# Patient Record
Sex: MALE
[De-identification: names, ages, dates, MRNs, and addresses within clinical notes are randomized per-mention and may not be internally consistent; named-entity substitution may affect disease eponyms.]

---

## 2018-11-14 ENCOUNTER — HOSPITAL ENCOUNTER (EMERGENCY)
Dept: HOSPITAL 97 - ER | Age: 43
Discharge: HOME | End: 2018-11-14
Payer: SELF-PAY

## 2018-11-14 DIAGNOSIS — E11.65: Primary | ICD-10-CM

## 2018-11-14 LAB
ALBUMIN SERPL BCP-MCNC: 4 G/DL (ref 3.4–5)
ALP SERPL-CCNC: 122 U/L (ref 45–117)
ALT SERPL W P-5'-P-CCNC: 55 U/L (ref 12–78)
AST SERPL W P-5'-P-CCNC: 22 U/L (ref 15–37)
BLD SMEAR INTERP: (no result)
BUN BLD-MCNC: 14 MG/DL (ref 7–18)
GIANT PLATELETS BLD QL SMEAR: (no result)
GLUCOSE SERPLBLD-MCNC: 280 MG/DL (ref 74–106)
HCT VFR BLD CALC: 47.9 % (ref 39.6–49)
INR BLD: 0.97
LYMPHOCYTES # SPEC AUTO: 2.4 K/UL (ref 0.7–4.9)
MAGNESIUM SERPL-MCNC: 1.9 MG/DL (ref 1.8–2.4)
MCH RBC QN AUTO: 28.9 PG (ref 27–35)
MCV RBC: 82.6 FL (ref 80–100)
MORPHOLOGY BLD-IMP: (no result)
NT-PROBNP SERPL-MCNC: 20 PG/ML (ref ?–125)
PMV BLD: 8.6 FL (ref 7.6–11.3)
POTASSIUM SERPL-SCNC: 3.9 MMOL/L (ref 3.5–5.1)
RBC # BLD: 5.8 M/UL (ref 4.33–5.43)
TROPONIN (EMERG DEPT USE ONLY): < 0.02 NG/ML (ref 0–0.04)

## 2018-11-14 PROCEDURE — 71045 X-RAY EXAM CHEST 1 VIEW: CPT

## 2018-11-14 PROCEDURE — 96360 HYDRATION IV INFUSION INIT: CPT

## 2018-11-14 PROCEDURE — 36415 COLL VENOUS BLD VENIPUNCTURE: CPT

## 2018-11-14 PROCEDURE — 85025 COMPLETE CBC W/AUTO DIFF WBC: CPT

## 2018-11-14 PROCEDURE — 81003 URINALYSIS AUTO W/O SCOPE: CPT

## 2018-11-14 PROCEDURE — 80048 BASIC METABOLIC PNL TOTAL CA: CPT

## 2018-11-14 PROCEDURE — 83735 ASSAY OF MAGNESIUM: CPT

## 2018-11-14 PROCEDURE — 93005 ELECTROCARDIOGRAM TRACING: CPT

## 2018-11-14 PROCEDURE — 99284 EMERGENCY DEPT VISIT MOD MDM: CPT

## 2018-11-14 PROCEDURE — 83880 ASSAY OF NATRIURETIC PEPTIDE: CPT

## 2018-11-14 PROCEDURE — 80076 HEPATIC FUNCTION PANEL: CPT

## 2018-11-14 PROCEDURE — 82962 GLUCOSE BLOOD TEST: CPT

## 2018-11-14 PROCEDURE — 85610 PROTHROMBIN TIME: CPT

## 2018-11-14 PROCEDURE — 84484 ASSAY OF TROPONIN QUANT: CPT

## 2018-11-14 NOTE — EDPHYS
Physician Documentation                                                                           

 [facility]                                                                

Name: Beto Perez                                                                               

Age: 42 yrs                                                                                       

Sex: Male                                                                                         

: 1975                                                                                   

MRN: [id number]                                                                                   

Arrival Date: 2018                                                                          

Time: 11:13                                                                                       

Account#: [id number]                                                                            

Bed 24                                                                                            

Private MD: None, None                                                                            

ED Physician Blayne Gallegos                                                                       

HPI:                                                                                              

                                                                                             

16:49 This 42 yrs old  Male presents to ER via Ambulatory with complaints of High     kb  

      Blood Sugar.                                                                                

16:49 The patient or guardian reports hyperglycemia, that was potentially precipitated by no  kb  

      particular event.                                                                           

16:49 Onset: The symptoms/episode began/occurred 1 week(s) ago. Associated signs and          kb  

      symptoms: Pertinent positives: chest pain. Current symptoms: In the emergency               

      department the patient's symptoms are unchanged from the initial presentation. The          

      patient has experienced similar episodes in the past, several times. The patient has        

      not recently seen a physician. Pt reports high blood sugar and chest pain for a week.       

      States it was 320 pta.                                                                      

                                                                                                  

Historical:                                                                                       

- Allergies:                                                                                      

11:28 No Known Allergies;                                                                     aj  

- Home Meds:                                                                                      

11:28 Glipizide Oral [Active]; Metformin Oral [Active];                                       aj  

- PMHx:                                                                                           

11:28 Diabetes - NIDDM;                                                                       aj  

- PSHx:                                                                                           

11:28 colostomy with reversal; Hernia repair;                                                 aj  

                                                                                                  

- Immunization history:: Adult Immunizations up to date.                                          

- Social history:: Smoking status: Patient/guardian denies using tobacco.                         

- Ebola Screening: : Patient negative for fever greater than or equal to 101.5 degrees            

  Fahrenheit, and additional compatible Ebola Virus Disease symptoms Patient denies               

  exposure to infectious person Patient denies travel to an Ebola-affected area in the            

  21 days before illness onset No symptoms or risks identified at this time.                      

                                                                                                  

                                                                                                  

ROS:                                                                                              

16:49 Constitutional: Negative for fever, chills, and weight loss, ENT: Negative for injury,  kb  

      pain, and discharge, Respiratory: Negative for shortness of breath, cough, wheezing,        

      and pleuritic chest pain, Abdomen/GI: Negative for abdominal pain, nausea, vomiting,        

      diarrhea, and constipation, Back: Negative for injury and pain, MS/Extremity: Negative      

      for injury and deformity, Skin: Negative for injury, rash, and discoloration, Neuro:        

      Negative for headache, weakness, numbness, tingling, and seizure.                           

16:49 Cardiovascular: Positive for chest pain, Negative for edema, orthopnea, palpitations,       

      paroxysmal nocturnal dyspnea.                                                               

                                                                                                  

Exam:                                                                                             

16:49 Constitutional:  This is a well developed, well nourished patient who is awake, alert,  kb  

      and in no acute distress. Head/Face:  Normocephalic, atraumatic. ENT:  Nares patent. No     

      nasal discharge, no septal abnormalities noted.  Tympanic membranes are normal and          

      external auditory canals are clear.  Oropharynx with no redness, swelling, or masses,       

      exudates, or evidence of obstruction, uvula midline.  Mucous membranes moist. Neck:         

      Trachea midline, no thyromegaly or masses palpated, and no cervical lymphadenopathy.        

      Supple, full range of motion without nuchal rigidity, or vertebral point tenderness.        

      No Meningismus. Chest/axilla:  Normal chest wall appearance and motion.  Nontender with     

      no deformity.  No lesions are appreciated. Cardiovascular:  Regular rate and rhythm         

      with a normal S1 and S2.  No gallops, murmurs, or rubs.  Normal PMI, no JVD.  No pulse      

      deficits. Respiratory:  Lungs have equal breath sounds bilaterally, clear to                

      auscultation and percussion.  No rales, rhonchi or wheezes noted.  No increased work of     

      breathing, no retractions or nasal flaring. Abdomen/GI:  Soft, non-tender, with normal      

      bowel sounds.  No distension or tympany.  No guarding or rebound.  No evidence of           

      tenderness throughout. Skin:  Warm, dry with normal turgor.  Normal color with no           

      rashes, no lesions, and no evidence of cellulitis. MS/ Extremity:  Pulses equal, no         

      cyanosis.  Neurovascular intact.  Full, normal range of motion. Neuro:  Awake and           

      alert, GCS 15, oriented to person, place, time, and situation.  Cranial nerves II-XII       

      grossly intact.  Motor strength 5/5 in all extremities.  Sensory grossly intact.            

      Cerebellar exam normal.  Normal gait.                                                       

                                                                                                  

Vital Signs:                                                                                      

11:28  / 89; Pulse 71; Resp 16; Temp 98.9; Pulse Ox 98% on R/A; Weight 99.34 kg; Height aj  

      5 ft. 6 in. (167.64 cm);                                                                    

13:19  / 91; Pulse 81; Resp 18; Pulse Ox 97% ;                                          tl3 

14:51  / 80; Pulse 64; Resp 18; Pulse Ox 99% on R/A;                                    tl3 

11:28 Body Mass Index 35.35 (99.34 kg, 167.64 cm)                                             aj  

                                                                                                  

MDM:                                                                                              

13:19 Patient medically screened.                                                             kb  

16:49 Data reviewed: vital signs, nurses notes. Data interpreted: Pulse oximetry: on room air kb  

      is 99 %. Interpretation: normal. Counseling: I had a detailed discussion with the           

      patient and/or guardian regarding: the historical points, exam findings, and any            

      diagnostic results supporting the discharge/admit diagnosis, lab results, radiology         

      results, the need for outpatient follow up, a family practitioner, to return to the         

      emergency department if symptoms worsen or persist or if there are any questions or         

      concerns that arise at home.                                                                

                                                                                                  

                                                                                             

13:24 Order name: Basic Metabolic Panel; Complete Time: 14:09                                 kb  

                                                                                             

13:24 Order name: CBC with Diff; Complete Time: 15:57                                         kb  

                                                                                             

13:24 Order name: LFT's; Complete Time: 14:09                                                 kb  

                                                                                             

13:24 Order name: Magnesium; Complete Time: 14:09                                             kb  

                                                                                             

13:24 Order name: NT PRO-BNP; Complete Time: 14:09                                            kb  

                                                                                             

13:24 Order name: PT-INR; Complete Time: 14:06                                                kb  

                                                                                             

13:24 Order name: Troponin (emerg Dept Use Only); Complete Time: 14:09                        kb  

                                                                                             

13:24 Order name: XRAY Chest (1 view); Complete Time: 14:52                                   kb  

                                                                                             

13:24 Order name: EKG; Complete Time: 13:25                                                   kb  

                                                                                             

13:52 Order name: Urine Dipstick--Ancillary (enter results); Complete Time: 16:45             bd  

                                                                                             

14:45 Order name: CBC Smear Scan; Complete Time: 15:57                                        EDMS

                                                                                             

15:25 Order name: EKG; Complete Time: 15:26                                                   kb  

                                                                                             

15:25 Order name: Troponin (emerg Dept Use Only); Complete Time: 16:45                        kb  

                                                                                             

13:24 Order name: Cardiac monitoring; Complete Time: 15:54                                    kb  

                                                                                             

13:24 Order name: EKG - Nurse/Tech; Complete Time: 15:54                                      kb  

                                                                                             

13:24 Order name: IV Saline Lock; Complete Time: 13:56                                        kb  

                                                                                             

13:24 Order name: Labs collected and sent; Complete Time: 13:56                               kb  

                                                                                             

13:24 Order name: O2 Per Protocol; Complete Time: 15:23                                       kb  

                                                                                             

13:24 Order name: O2 Sat Monitoring; Complete Time: 15:23                                     kb  

                                                                                             

15:25 Order name: EKG - Nurse/Tech; Complete Time: 15:54                                      kb  

                                                                                                  

Administered Medications:                                                                         

15:23 Drug: NS 0.9% 1000 ml Route: IV; Rate: 1000 ml; Site: right antecubital; Delivery:      tl3 

      Primary tubing;                                                                             

16:30 Follow up: IV Status: Completed infusion; IV Intake: 1000ml                             tl3 

                                                                                                  

                                                                                                  

Point of Care Testing:                                                                            

      Blood Glucose:                                                                              

11: Blood Glucose: 314 mg/dL;                                                               saima  

      Ranges:                                                                                     

      Critical Glucose Levels:Adult <50 mg/dl or >400 mg/dl  <40 mg/dl or >180 mg/dl       

Disposition:                                                                                      

11/15                                                                                             

13:53 Co-signature as Attending Physician, Blayne Gallegos MD I agree with the assessment and   kdr 

      plan of care.                                                                               

                                                                                                  

Disposition:                                                                                      

18 16:48 Discharged to Home. Impression: Hyperglycemia, unspecified, Chest pain,            

  unspecified.                                                                                    

- Condition is Stable.                                                                            

- Discharge Instructions: Nonspecific Chest Pain, Easy-to-Read, Hyperglycemia,                    

  Easy-to-Read.                                                                                   

                                                                                                  

- Work release form, Medication Reconciliation Form, Thank You Letter, Antibiotic                 

  Education, Prescription Opioid Use form.                                                        

- Follow up: Private Physician; Reason: Recheck today's complaints, Continuance of                

  care, Re-evaluation by your physician. Follow up: Emergency Department; When: As                

  needed; Reason: Worsening of condition.                                                         

                                                                                                  

                                                                                                  

                                                                                                  

Signatures:                                                                                       

Dispatcher [employer]                           ISAKMS                                                 

Lorie Rees, FNP-C                 FNP-Ckb                                                   

Selene Light RN RN aj Rittger, Kevin, MD MD kdr Lowrey, Tammy, RN                       RN   tl3                                                  

                                                                                                  

Corrections: (The following items were deleted from the chart)                                    

                                                                                             

17:31 16:48 2018 16:48 Discharged to Home. Impression: Hyperglycemia, unspecified;      tl3 

      Chest pain, unspecified. Condition is Stable. Forms are Medication Reconciliation Form,     

      Thank You Letter, Antibiotic Education, Prescription Opioid Use. Follow up: Private         

      Physician; Reason: Recheck today's complaints, Continuance of care, Re-evaluation by        

      your physician. Follow up: Emergency Department; When: As needed; Reason: Worsening of      

      condition. kb                                                                               

                                                                                                  

**************************************************************************************************

## 2018-11-14 NOTE — XMS REPORT
Summary of Care: 17 - 17

 Created on:2112



Patient:DASHA HINOJOSA

Sex:Male

:1975

External Reference #:[id number]





Demographics







 Address  [address] [address]

 

 Home Phone  [phone]

 

 Email Address  NONE

 

 Preferred Language  English

 

 Marital Status  Single

 

 Mu-ism Affiliation  Sikh

 

 Race  Other

 

 Ethnic Group   or 









Author







 Organization  [facility]

 

 Address  [address] [place] [address]-

 

 Phone  [phone]









Encounter

HQ Axel(FIN) [id number] Date(s): 17 - 17

[facility] [address] [place] [address]-     
 

Discharge Diagnosis: Acute Hyperglycemia

Discharge Disposition: Home or Self Care

Attending Physician: Chidi Garcia MD





Vital Signs







 Most recent to oldest  1  2  3



 [Reference Range]:      

 

 Height  167.64 cm    



   (17 11:56 PM)    

 

 Temperature Oral [96.4-99.1  98.2 DegF  98.3 DegF  



 DegF]  (17 4:35 AM)  (17 11:56 PM)  

 

 Blood Pressure [/60-90  124/68 mmHg  138/90 mmHg  136/92 mmHg



 mmHg]  (17 4:35 AM)  (17 3:15 AM)  (17 11:56 PM)

 

 Respiratory Rate [14-20 BRMIN]  16 BRMIN  18 BRMIN  



   (17 3:15 AM)  (17 11:56 PM)  

 

 Peripheral Pulse Rate [  65 bpm  78 bpm  



 bpm]  (17 3:15 AM)  (17 11:56 PM)  

 

 Weight  109.091 kg    



   (17 11:56 PM)    

 

 Body Mass Index  38.82 m2    



   (17 11:56 PM)    







Problem List







 Condition  Effective Dates  Status  Health Status  Informant

 

 Diverticulitis(Confirmed)    Resolved    

 

 DM (diabetes mellitus)(Confirmed)    Resolved    

 

 Obesity(Confirmed)    Resolved    







Allergies, Adverse Reactions, Alerts







 Substance  Reaction  Severity  Status

 

 morphine      Active







Medications

glipiZIDE-metformin 2.5 mg-500 mg oral tablet

1 tab, PO, BID, # 60 tab, 0 Refill(s)

Start Date: 17

Status: OrderedInsulin regular

8 unit, 0.08 mL, Route: IVP, Drug form: INJ, ONCE, Dosing Weight 109.091, kg, 
Priority: STAT, Start date: 17 3:29:00 CST, Stop date: 17 3:29:00 
CST

Notes: (Same as: Humulin R and NovoLIN R)WASTE: F/P - Black; E - Municipal 
Trash Bin  (Do not shake)

Start Date: 17

Stop Date: 17

Status: OrderedInsulin regular

10 unit, 0.1 mL, Route: IVP, Drug form: INJ, ONCE, Dosing Weight 109.091, kg, 
Priority: STAT, Start date: 17 2:39:00 CST, Stop date: 17 2:39:00 
CST

Notes: (Same as: Humulin R and NovoLIN R)WASTE: F/P - Black; E - Municipal 
Trash Bin  (Do not shake)

Start Date: 17

Stop Date: 17

Status: CompletedKeflex 500 mg oral capsule

500 mg=1 cap, PO, QID, X 7 day, # 28 cap, 0 Refill(s)

Start Date: 17

Stop Date: 17

Status: Orderednystatin topical 100,000 units/g ointment

1 appl, TOP, TID, X 7 day, # 15 gm, 0 Refill(s)

Start Date: 17

Stop Date: 17

Status: OrderedSodium Chloride 0.9% (Bolus) IV

1,000 mL, 1000 ml/hr, Infuse Over: 1 hr, Route: IV, 1,000, Drug form: INJ, ONCE
, Priority: STAT, Dosing Weight 109.091 kg, Start date: 17 2:39:00 CST, 
Duration: 1 doses or times, Stop date: 17 2:39:00 CST

Start Date: 17

Stop Date: 17

Status: Completed



Results

ELECTROLYTES





 Most recent to oldest [Reference Range]:  1

 

 Sodium Lvl [135-145 mEq/L]  138 mEq/L



   (17 2:55 AM)

 

 Potassium Lvl [3.5-5.1 mEq/L]  3.8 mEq/L



   (17 2:55 AM)

 

 Chloride Lvl [ mEq/L]  100 mEq/L



   (17 2:55 AM)

 

 CO2 [24-32 mEq/L]  27 mEq/L



   (17 2:55 AM)

 

 AGAP [10.0-20.0 mEq/L]  14.8 mEq/L



   (17 2:55 AM)



CHEM PANEL





 Most recent to oldest [Reference Range]:  1

 

 Creatinine Lvl [0.50-1.40 mg/dL]  0.84 mg/dL



   (17 2:55 AM)

 

 eGFR  109 mL/min/1.73m2 1



   *NA*



   (17 2:55 AM)

 

 BUN [7-22 mg/dL]  12 mg/dL



   (17 2:55 AM)

 

 B/C Ratio [6-25]  14



   (17 2:55 AM)

 

 Glucose Lvl [70-99 mg/dL]  393 mg/dL



   *HI*



   (17 2:55 AM)

 

 Total Protein [6.4-8.4 g/dL]  7.4 g/dL



   (17 2:55 AM)

 

 Albumin Lvl [3.5-5.0 g/dL]  3.8 g/dL



   (17 2:55 AM)

 

 Globulin [2.7-4.2 g/dL]  3.6 g/dL



   (17 2:55 AM)

 

 A/G Ratio [0.7-1.6]  1.1



   (17 2:55 AM)

 

 Calcium Lvl [8.5-10.5 mg/dL]  8.9 mg/dL



   (17 2:55 AM)

 

 ALT [0-65 unit/L]  45 unit/L



   (17 2:55 AM)

 

 AST [0-37 unit/L]  16 unit/L



   (17 2:55 AM)

 

 Alk Phos [ unit/L]  147 unit/L



   *HI*



   (17 2:55 AM)

 

 Bili Total [0.2-1.3 mg/dL]  0.7 mg/dL



   (17 2:55 AM)



1Result Comment: The eGFR is calculated using the CKD-EPI formula. In most young
, healthy individualsthe eGFR will be >90 mL/min/1.73m2. The eGFR declines with 
age. An eGFR of 60-89 may be normal in some populations, particularly the 
elderly, for whom the CKD-EPI formula has not been extensively validated. Use 
of the eGFR is not recommended in the following populations:



Individuals with unstable creatinine concentrations, including pregnant 
patients and those with serious co-morbid conditions.



Patients with extremes in muscle mass or diet.



The data above are obtained from the National Kidney Disease Education Program (
NKDEP) which additionally recommends that when the eGFR is used in patients 
with extremes of body mass index for purposesof drug dosing, the eGFR should be 
multiplied by the estimated BMI.URINE AND STOOL





 Most recent to oldest [Reference Range]:  1

 

 UA Turbidity [Clear]  Clear



   (17 2:55 AM)

 

 UA Color [Yellow]  Yellow



   *NA*



   (17 2:55 AM)

 

 UA pH [5.0-8.0]  5.0



   (17 2:55 AM)

 

 UA Spec Grav [<=1.030]  <=1.005



   *NA*



   (17 2:55 AM)

 

 UA Glucose [Negative mg/dL]  500 mg/dL



   *ABN*



   (17 2:55 AM)

 

 UA Blood [Negative]  Negative



   (17 2:55 AM)

 

 UA Ketones [Negative]  Negative



   *NA*



   (17 2:55 AM)

 

 UA Protein [Negative]  Negative



   (17 2:55 AM)

 

 UA Urobilinogen [0.1-1.0 EU/dL]  0.2 EU/dL



   (17 2:55 AM)

 

 UA Bili [Negative]  Negative



   *NA*



   (17 2:55 AM)

 

 UA Leuk Est [Negative]  Negative



   (17 2:55 AM)

 

 UA Nitrite [Negative]  Negative



   (17 2:55 AM)

 

 UA WBC [None Seen /HPF]  0-2 /HPF



   (17 2:55 AM)

 

 UA RBC [0-2]  None Seen



   (17 2:55 AM)

 

 UA Bacteria [None Seen]  None Seen



   (17 2:55 AM)

 

 UA Sq Epi [Few]  None Seen



   (17 2:55 AM)

 

 UA Mucus [None Seen]  None Seen



   (17 2:55 AM)

 

 UA Bud Yeast [None Seen /HPF]  Few /HPF



   *ABN*



   (17 2:55 AM)



HEMATOLOGY





 Most recent to oldest [Reference Range]:  1

 

 WBC [3.7-10.4 K/CMM]  8.6 K/CMM



   (17 2:55 AM)

 

 RBC [4.70-6.10 M/CMM]  5.47 M/CMM



   (17 2:55 AM)

 

 Hgb [14.0-18.0 g/dL]  15.5 g/dL



   (17 2:55 AM)

 

 Hct [42.0-54.0 %]  43.9 %



   (17 2:55 AM)

 

 MCV [80.0-94.0 fL]  80.3 fL



   (17 2:55 AM)

 

 MCH [27.0-31.0 pg]  28.3 pg



   (17 2:55 AM)

 

 MCHC [32.0-36.0 g/dL]  35.2 g/dL



   (17 2:55 AM)

 

 RDW [11.5-14.5 %]  13.7 %



   (17 2:55 AM)

 

 Platelet [133-450 K/CMM]  243 K/CMM



   (17 2:55 AM)

 

 MPV [7.4-10.4 fL]  8.3 fL



   (17 2:55 AM)

 

 Segs [45.0-75.0 %]  60.7 %



   (17 2:55 AM)

 

 Lymphocytes [20.0-40.0 %]  29.4 %



   (17 2:55 AM)

 

 Monocytes [2.0-12.0 %]  7.1 %



   (17 2:55 AM)

 

 Eosinophils [0.0-4.0 %]  1.8 %



   (17 2:55 AM)

 

 Basophils [0.0-1.0 %]  1.0 %



   (17 2:55 AM)

 

 Segs-Bands # [1.5-8.1 K/CMM]  5.2 K/CMM



   (17 2:55 AM)

 

 Lymphocytes # [1.0-5.5 K/CMM]  2.5 K/CMM



   (17 2:55 AM)

 

 Monocytes # [0.0-0.8 K/CMM]  0.6 K/CMM



   (17 2:55 AM)

 

 Eosinophils # [0.0-0.5 K/CMM]  0.2 K/CMM



   (17 2:55 AM)

 

 Basophils # [0.0-0.2 K/CMM]  0.1 K/CMM



   (17 2:55 AM)







Immunizations

No data available for this section



Procedures







 Procedure  Date  Related Diagnosis  Body Site

 

 Closure of colostomy      







Social History







 Social History Type  Response

 

 Smoking Status  Never smoker; Exposure to Tobacco Smoke None; Cigarette Smoking



   Last 365 Days No; Reg Smoking Cessation Counseling No







Assessment and Plan

No data available for this section

## 2018-11-14 NOTE — XMS REPORT
Encounter??CCD:??11/15/2011??to??2011

 Created on:2104



Patient:DASHA HINOJOSA

Sex:Male

:1975

External Reference #:[id number]





Demographics







 Address  [address] [address]



   [place] [address]

 

 Home Phone  [phone]

 

 Preferred Language  Unknown

 

 Marital Status  Unknown

 

 Caodaism Affiliation  Unknown

 

 Race  Unknown

 

 Ethnic Group  Unknown









Author







 Organization  [facility]









Care Team Providers







 Name  Role  Phone

 

 ROPUser, [facility]  Consulting Provider  Unavailable

 

 Anthony Parker  Consulting Provider  [phone]

 

 Sofia Jones  Consulting Provider  [phone]

 

 Jackelin Stein  Consulting Provider  Unavailable

 

 Wesley Mendoza  Consulting Provider  Unavailable

 

 PCP, None  Consulting Provider  Unavailable

 

 Eleazar Rothman  Consulting Provider  [phone]

 

 ChartServer, Login  Consulting Provider  Unavailable

 

 Ernesto Brumfield  Consulting Provider  [phone]

 

 Ami Longo  Consulting Provider  Unavailable

 

 Nishi Cuello  Consulting Provider  Unavailable

 

 Stephen Macedo  Consulting Provider  [phone]

 

 SYSTEM, SYSTEM  Consulting Provider  Unavailable

 

 Unavailable  Primary Care Provider  Unavailable

 

 Saturnino Gaona  Consulting Provider  [phone]

 

 Bibiana Mendez  Consulting Provider  Unavailable

 

 Elmer Carrillo  Consulting Provider  Unavailable









Allergies, Adverse Reactions, Alerts







 Substance  Reaction  Status

 

 morphine  ??  Active







Medications







 Medication  Instructions  Start Date  End Date  Status

 

 Insulin regular  6 unit, Route: IV, ONCE,  2011  Completed



   Start date: 11      



   4:00:00, Stop date:      



   11 4:00:00      

 

 Insulin regular  10 unit, Route: IVP, ONCE,  2011  Completed



   Priority: STAT, Start      



   date: 11 0:49:00,      



   Stop date: 11      



   0:49:00      

 

 Insulin regular  10 unit, Route: SUB-Q,  11/15/2011  11/15/2011  Completed



   ONCE, Priority: STAT,      



   Start date: 11/15/11      



   23:25:00, Stop date:      



   11/15/11 23:25:00      

 

 Insulin regular  10 unit, Route: IVP, ONCE,  11/15/2011  11/15/2011  Completed



   Priority: STAT, Start      



   date: 11/15/11 23:25:00,      



   Stop date: 11/15/11      



   23:25:00      

 

 Sodium Chloride 0.9%  1,000 mL, Rate: 1,000  11/15/2011  2011  Completed



 (Bolus) IV 1000 mL  ml/hr, Infuse over: 1 hr,      



   Route: IV, Total Volume:      



   1,000, Bolus Dose,      



   Priority: STAT, Start      



   date: 11/15/11 23:24:00,      



   Duration: 1 doses or      



   times, Stop date: 11      



   0:23:00      

 

 Sodium Chloride 0.9%  1,000 mL, Rate: 1,000  11/15/2011  11/15/2011  Completed



 (Bolus) IV 1000 mL  ml/hr, Infuse over: 1 hr,      



   Route: IV, Total Volume:      



   1,000, Bolus Dose,      



   Priority: STAT, Start      



   date: 11/15/11 23:24:00,      



   Duration: 1 doses or      



   times, Stop date: 11      



   0:23:00      

 

 metFORmin 500 mg oral  500 mg, 1 tab, PO, BID, 60  2011  ??  Ordered



 tablet  tab, Substitution Allowed      

 

 Sodium Chloride 0.9%  1,000 mL, Rate: 1,000  2011  Completed



 (Bolus) IV 1000 mL  ml/hr, Infuse over: 1 hr,      



   Route: IV, Total Volume:      



   1,000, Bolus Dose,      



   Priority: STAT, Start      



   date: 11 2:51:00,      



   Duration: 1 doses or      



   times, Stop date: 11      



   3:50:00      

 

 metFORmin 500 mg oral  500 mg, 1 tab, Route: PO,  2011  
Discontinued



 tablet, extended release  Drug form: ERTAB, Daily,      



   Start date: 11      



   4:16:00, Duration: 30 day,      



   Stop date: 12/15/11      



   9:00:00      







Vital Signs







 Most recent to oldest  1  2  3



 [Reference Range]:      

 

 Height  165.10 cm  ??  ??



   (11/15/2011 22:58:00) ??    

 

 Temperature Oral  98.5 DegF  98.3 DegF  ??



 [96.4-99.1 DegF]  (2011 04:42:00) ??  (11/15/2011 22:58:00) ??  

 

 Systolic Blood Pressure  156 mmHg  168 mmHg  178 mmHg



 [ mmHg]  *HI*  *HI*  *HI*



   (2011 04:42:00) ??  (2011 03:30:00) ??  (2011 01:33:00) ??

 

 Diastolic Blood Pressure  84 mmHg  93 mmHg  81 mmHg



 [60-90 mmHg]  (2011 04:42:00) ??  *HI*  (2011 01:33:00) ??



     (2011 03:30:00) ??  

 

 Respiratory Rate [14-20  18 BRMIN  18 BRMIN  18 BRMIN



 BRMIN]  (2011 04:42:00) ??  (2011 03:30:00) ??  (2011 01:33:
00) ??

 

 Peripheral Pulse Rate  80 bpm  78 bpm  89 bpm



 [ bpm]  (2011 04:42:00) ??  (2011 03:30:00) ??  (2011 
01:33:00) ??

 

 Weight  113.636 kg  ??  ??



   (11/15/2011 22:58:00) ??    







Results

BEDSIDE GLUCOSE TESTING





 Most recent to oldest  1  2  3



 [Reference Range]:      

 

 Gluc POC Lifscn [  302 mg/dL 1  320 mg/dL 2  >400 mg/dL 3



 mg/dL]  *HI*  *HI*  *CRIT*



   (2011 03:58:00) ??  (2011 02:46:00) ??  (2011 01:24:00) ??

 

 Comment1  Notify RN/MD  Notify RN/MD  ??



   *NA*  *NA*  



   (2011 00:38:00) ??  (11/15/2011 23:02:00) ??  



1Interpretive Data:  Upper Reportable Limit: 200 mg/dL.2Interpretive 
Data:  Upper Reportable Limit: 200 mg/dL.3Interpretive Data:  
Upper Reportable Limit: 200 mg/dL.CHEMISTRY





 Most recent to oldest [Reference Range]:  1  2  3

 

 Sodium Lvl [135-145 mEq/L]  123 mEq/L  ??  ??



   *LOW*    



   (11/15/2011 23:20:00) ??    

 

 Potassium Lvl [3.5-5.1 mEq/L]  4.4 mEq/L  ??  ??



   (11/15/2011 23:20:00) ??    

 

 Chloride Lvl [ mEq/L]  90 mEq/L  ??  ??



   *LOW*    



   (11/15/2011 23:20:00) ??    

 

 CO2 [24-32 mEq/L]  22 mEq/L  ??  ??



   *LOW*    



   (11/15/2011 23:20:00) ??    

 

 AGAP [10.0-20.0 mEq/L]  15.4 mEq/L  ??  ??



   (11/15/2011 23:20:00) ??    

 

 Creatinine Lvl [0.5-1.4 mg/dL]  1.0 mg/dL  ??  ??



   (11/15/2011 23:20:00) ??    

 

 BUN [7-22 mg/dL]  12 mg/dL  ??  ??



   (11/15/2011 23:20:00) ??    

 

 Glucose Lvl  725 mg/dL 4,??5  ??  ??



   *CRIT*    



   (11/15/2011 23:20:00) ??    

 

 Calcium Lvl [8.5-10.5 mg/dL]  9.3 mg/dL  ??  ??



   (11/15/2011 23:20:00) ??    



4Result Comment: Critical Result(s) called to Katey at 2011 0:36 by_.tas
  Read back OK.5Interpretive Data: Reference Ranges :     0 - 7 days  : 41 - 90 
mg/dL7 days - 150 yrs : 70 - 99 mg/dL (fasting), based on the clinical 
recommendations of the American Diabetes Association.HEMATOLOGY





 Most recent to oldest [Reference Range]:  1  2  3

 

 WBC [3.7-10.4 K/CMM]  8.1 K/CMM  ??  ??



   (11/15/2011 23:20:00) ??    

 

 RBC [4.70-6.10 M/CMM]  5.64 M/CMM  ??  ??



   (11/15/2011 23:20:00) ??    

 

 Hgb [14.0-18.0 g/dL]  16.5 g/dL  ??  ??



   (11/15/2011 23:20:00) ??    

 

 Hct [42.0-54.0 %]  45.9 %  ??  ??



   (11/15/2011 23:20:00) ??    

 

 MCV [80.0-94.0 fL]  81.3 fL  ??  ??



   (11/15/2011 23:20:00) ??    

 

 MCH [27.0-31.0 pg]  29.3 pg  ??  ??



   (11/15/2011 23:20:00) ??    

 

 MCHC [32.0-36.0 g/dL]  36.1 g/dL  ??  ??



   *HI*    



   (11/15/2011 23:20:00) ??    

 

 RDW [11.5-14.5 %]  13.3 %  ??  ??



   (11/15/2011 23:20:00) ??    

 

 Platelet [133-450 K/CMM]  230 K/CMM  ??  ??



   (11/15/2011 23:20:00) ??    

 

 MPV [7.4-10.4 fL]  9.3 fL  ??  ??



   (11/15/2011 23:20:00) ??    

 

 Segs [45.0-75.0 %]  70.0 %  ??  ??



   (11/15/2011 23:20:00) ??    

 

 Lymphocytes [20.0-40.0 %]  23.4 %  ??  ??



   (11/15/2011 23:20:00) ??    

 

 Monocytes [2.0-12.0 %]  5.3 %  ??  ??



   (11/15/2011 23:20:00) ??    

 

 Eosinophils [0.0-4.0 %]  1.0 %  ??  ??



   (11/15/2011 23:20:00) ??    

 

 Basophils [0.0-1.0 %]  0.3 %  ??  ??



   (11/15/2011 23:20:00) ??    

 

 Segs-Bands # [1.5-8.1 K/CMM]  5.6 K/CMM  ??  ??



   (11/15/2011 23:20:00) ??    

 

 Lymphocytes # [1.0-5.5 K/CMM]  1.9 K/CMM  ??  ??



   (11/15/2011 23:20:00) ??    

 

 Monocytes # [0.0-0.8 K/CMM]  0.4 K/CMM  ??  ??



   (11/15/2011 23:20:00) ??    

 

 Eosinophils # [0.0-0.5 K/CMM]  0.1 K/CMM  ??  ??



   (11/15/2011 23:20:00) ??    

 

 Basophils # [0.0-0.2 K/CMM]  0.0 K/CMM  ??  ??



   (11/15/2011 23:20:00) ??

## 2018-11-14 NOTE — XMS REPORT
Summary of Care: 14 - 14

 Created on:2026



Patient:DASHA HINOJOSA

Sex:Male

:1975

External Reference #:[id number]





Demographics







 Address  [address]



   [address] [address]

 

 Home Phone  [phone]

 

 Preferred Language  English

 

 Marital Status  Single

 

 Alevism Affiliation  Cheondoism

 

 Race  Other

 

 Ethnic Group  









Author







Care Team Providers







 Name  Role  Phone

 

 Unavailable  Primary Care Physician  Unavailable









Encounter

HQ Axel(SARAH) [id number] Date(s): 14 - 14

[facility] [address] [address]

Discharge Disposition: Home

Physician Attending: Oliva Steel

Physician Admitting: Oliva Steel





Reason for Visit

CHEST PAIN



Vital Signs







 Most recent to oldest  1  2  3



 [Reference Range]:      

 

 Temperature Oral [96.4-99.1  98.2 DegF  98.4 DegF  98.4 DegF



 DegF]  (14 4:00 AM)  (14 1:20 AM)  (14 12:07 AM)

 

 Systolic Blood Pressure  127 mmHg  131 mmHg  151 mmHg



 [ mmHg]  (14 4:00 AM)  (14 1:20 AM)  *HI*



       (14 12:07 AM)

 

 Diastolic Blood Pressure  80 mmHg  86 mmHg  96 mmHg



 [60-90 mmHg]  (14 4:00 AM)  (14 1:20 AM)  *HI*



       (14 12:07 AM)

 

 Respiratory Rate [14-20  19 BRMIN  18 BRMIN  18 BRMIN



 BRMIN]  (14 4:00 AM)  (14 1:20 AM)  (14 12:07 AM)

 

 Peripheral Pulse Rate [  68 bpm  64 bpm  71 bpm



 bpm]  (14 4:00 AM)  (14 1:20 AM)  (14 12:07 AM)

 

 Weight  109.091 kg    



   (14 12:07 AM)    







Problem List







 Condition  Effective Dates  Status  Health Status  Informant

 

 Diverticulitis(Confirmed)    Resolved    

 

 DM (diabetes mellitus)(Confirmed)    Resolved    

 

 Obesity(Confirmed)    Resolved    







Allergies, Adverse Reactions, Alerts







 Substance  Reaction  Severity  Status

 

 morphine      Active







Medications

aspirin

324 mg, Route: PO, ONCE, Dosing Weight 109.091, kg, Priority: STAT, Start date: 
14 0:49:00, Stop date: 14 0:49:00

Start Date: 14

Stop Date: 14

Status: CompletedglipiZIDE 10 mg oral tablet

10 mg=1 tab, PO, Daily, # 30 tab, 0 Refill(s)

Start Date: 14

Status: OrderedmetFORMIN 500 mg oral tablet

500 mg=1 tab, PO, BID, # 30 tab, 0 Refill(s)

Start Date: 14

Status: OrderedSaline Flush 0.9%

5 mL, Route: IVP, Drug Form: INJ, Dosing Weight 109.091, kg, Q8H, PRN Line Flush
, Start date: 14 0:49:00, Duration: 30 day, Stop date: 14 0:48:00, 
Administer at least once every 8 hours

Special Instructions: Administer at least once every 8 hours

Notes: Same as: BD Posiflush Sterile

Start Date: 14

Stop Date: 14

Status: Discontinued



Results

ELECTROLYTES





 Most recent to oldest [Reference Range]:  1

 

 Sodium Lvl [135-145 mEq/L]  137 mEq/L



   (14 1:16 AM)

 

 Potassium Lvl [3.5-5.1 mEq/L]  3.7 mEq/L



   (14 1:16 AM)

 

 Chloride Lvl [ mEq/L]  106 mEq/L



   (14 1:16 AM)

 

 CO2 [24-32 mEq/L]  24 mEq/L



   (14 1:16 AM)

 

 AGAP [10.0-20.0 mEq/L]  10.7 mEq/L



   (14 1:16 AM)



CHEM PANEL





 Most recent to oldest [Reference Range]:  1

 

 Creatinine Lvl [0.5-1.4 mg/dL]  0.8 mg/dL



   (14 1:16 AM)

 

 eGFR  113 mL/min/1.73m2 1



   *NA*



   (14 1:16 AM)

 

 BUN [7-22 mg/dL]  15 mg/dL



   (14 1:16 AM)

 

 B/C Ratio [6-25]  19



   (14 1:16 AM)

 

 Glucose Lvl [70-99 mg/dL]  252 mg/dL 2



   *HI*



   (14 1:16 AM)

 

 Total Protein [6.4-8.4 g/dL]  7.7 g/dL



   (14 1:16 AM)

 

 Albumin Lvl [3.5-5.0 g/dL]  4.0 g/dL



   (14 1:16 AM)

 

 Globulin [2.0-4.0 g/dL]  3.7 g/dL



   (14 1:16 AM)

 

 A/G Ratio [0.7-1.6]  1.1



   (14 1:16 AM)

 

 Calcium Lvl [8.5-10.5 mg/dL]  9.0 mg/dL



   (14 1:16 AM)

 

 ALT [0-65 unit/L]  108 unit/L



   *HI*



   (14 1:16 AM)

 

 AST [0-37 unit/L]  34 unit/L



   (14 1:16 AM)

 

 Alk Phos [ unit/L]  86 unit/L



   (14 1:16 AM)

 

 Bili Total [0.2-1.3 mg/dL]  0.6 mg/dL



   (14 1:16 AM)



1Result Comment: The eGFR is calculated using the CKD-EPI formula. In most young
, healthy individualsthe eGFR will be >90 mL/min/1.73m2. The eGFR declines with 
age. An eGFR of 60-89 may be normal in some populations, particularly the 
elderly, for whom the CKD-EPI formula has not been extensively validated. Use 
of the eGFR is not recommended in the following populations:



Individuals with unstable creatinine concentrations, including pregnant 
patients and those with serious co-morbid conditions.



Patients with extremes in muscle mass or diet.



The data above are obtained from the National Kidney Disease Education Program (
NKDEP) which additionally recommends that when the eGFR is used in patients 
with extremes of body mass index for purposesof drug dosing, the eGFR should be 
multiplied by the estimated BMI.2Interpretive Data: Adult reference range 
values reflect the clinical guidelines

of the [employer].CARDIAC ENZYMES





 Most recent to oldest [Reference Range]:  1

 

 Total CK [ unit/L]  201 unit/L



   *HI*



   (14 1:16 AM)

 

 CK MB [0.5-3.6 ng/mL]  1.4 ng/mL



   (14 1:16 AM)

 

 CK MB Index [0.0-2.5]  0.7



   (14 1:16 AM)

 

 Troponin-I [0.00-0.40 ng/mL]  <0.02 ng/mL



   (14 1:16 AM)



HEMATOLOGY





 Most recent to oldest [Reference Range]:  1

 

 WBC [3.7-10.4 K/CMM]  8.1 K/CMM



   (14 1:16 AM)

 

 RBC [4.70-6.10 M/CMM]  5.33 M/CMM



   (14 1:16 AM)

 

 Hgb [14.0-18.0 g/dL]  15.0 g/dL



   (14 1:16 AM)

 

 Hct [42.0-54.0 %]  43.2 %



   (14 1:16 AM)

 

 MCV [80.0-94.0 fL]  81.0 fL



   (14 1:16 AM)

 

 MCH [27.0-31.0 pg]  28.1 pg



   (14 1:16 AM)

 

 MCHC [32.0-36.0 g/dL]  34.7 g/dL



   (14 1:16 AM)

 

 RDW [11.5-14.5 %]  13.7 %



   (14 1:16 AM)

 

 Platelet [133-450 K/CMM]  269 K/CMM



   (14 1:16 AM)

 

 MPV [7.4-10.4 fL]  8.2 fL



   (14 1:16 AM)

 

 Segs [45.0-75.0 %]  61.0 %



   (14 1:16 AM)

 

 Lymphocytes [20.0-40.0 %]  29.8 %



   (14 1:16 AM)

 

 Monocytes [2.0-12.0 %]  6.6 %



   (14 1:16 AM)

 

 Eosinophils [0.0-4.0 %]  2.2 %



   (14 1:16 AM)

 

 Basophils [0.0-1.0 %]  0.4 %



   (14 1:16 AM)

 

 Segs-Bands # [1.5-8.1 K/CMM]  4.9 K/CMM



   (14 1:16 AM)

 

 Lymphocytes # [1.0-5.5 K/CMM]  2.4 K/CMM



   (14 1:16 AM)

 

 Monocytes # [0.0-0.8 K/CMM]  0.5 K/CMM



   (14 1:16 AM)

 

 Eosinophils # [0.0-0.5 K/CMM]  0.2 K/CMM



   (14 1:16 AM)

 

 Basophils # [0.0-0.2 K/CMM]  0.0 K/CMM



   (14 1:16 AM)

 

 PT [12.0-14.7 seconds]  12.7 seconds



   (14 1:16 AM)

 

 INR [0.85-1.17]  0.96 3



   (14 1:16 AM)

 

 PTT [22.9-35.8 seconds]  31.7 seconds 4



   (14 1:16 AM)



3Interpretive Data: RECOMMENDED RANGES FOR PROTIME INR:

   2.0-3.0 for most medical and surgical thromboembolic states.

   2.5-3.5 for artificial heart valves and recurrent embolism.



INR SHOULD BE USED ONLY FOR PATIENTS ON STABLE ANTICOAGULANT 
THERAPY.4Interpretive Data: Heparin Therapeutic Range:  57 - 92 Seconds



Medications Administered During Your Visit

No data available for this section



Immunizations

No data available for this section



Procedures







 Procedure Type  Body Site  Date of Procedure  Related Diagnosis

 

 Closure of colostomy

## 2018-11-14 NOTE — XMS REPORT
Summary of Care: 18 - 18

 Created on:2117



Patient:DASHA HINOJOSA

Sex:Male

:1975

External Reference #:[id number]





Demographics







 Address  [address] [address]-

 

 Home Phone  [phone]

 

 Email Address  NONE

 

 Preferred Language  English

 

 Marital Status  Single

 

 Judaism Affiliation  [employer]

 

 Race  Other

 

 Additional Race(s)  Unavailable

 

 Ethnic Group   or 









Author







 Organization  [facility]

 

 Address  [address] [address], [place] [address]-









Encounter

HQ Axel(FIN) [id number] Date(s): 18 - 18

[facility] [address] [place] [address]-     [phone]
[phone]

Encounter Diagnosis

Non-ST elevation (NSTEMI) myocardial infarction (Final) - 18

Type 2 diabetes mellitus without complications (Final) -

Obesity, unspecified (Final) -

Essential (primary) hypertension (Final) -

Diaphragmatic hernia without obstruction or gangrene (Final) -

Long term (current) use of oral hypoglycemic drugs (Final) -

Body mass index (BMI) 34.0-34.9, adult (Final) -

Discharge Disposition: Home or Self Care

Attending Physician: Deidra Miranda MD

Admitting Physician: Deidra Miranda MD





Vital Signs







 Most recent to oldest [Reference  1  2  3



 Range]:      

 

 Height  167.64 cm    



   (18 8:38 AM)    

 

 Temperature Oral [96.4-99.1  98.1 DegF  97.7 DegF  98.6 DegF



 DegF]  (18 11:18 AM)  (18 7:11 AM)  (18 4:06 AM)

 

 Blood Pressure [/60-90  107/69 mmHg  116/74 mmHg  117/73 mmHg



 mmHg]  (18 11:18 AM)  (18 7:11 AM)  (18 4:06 AM)

 

 Respiratory Rate [14-20 BRMIN]  16 BRMIN  16 BRMIN  16 BRMIN



   (18 11:18 AM)  (18 7:11 AM)  (18 12:00 AM)

 

 Peripheral Pulse Rate [  75 bpm  78 bpm  72 bpm



 bpm]  (18 11:18 AM)  (18 7:11 AM)  (18 4:06 AM)

 

 Weight  95.909 kg    



   (18 8:38 AM)    

 

 Body Mass Index  34.13 m2    



   (18 8:38 AM)    







Problem List







 Condition  Effective Dates  Status  Health Status  Informant

 

 Diverticulitis(Confirmed)    Resolved    

 

 DM (diabetes mellitus)(Confirmed)    Resolved    

 

 Obesity(Confirmed)    Resolved    







Allergies, Adverse Reactions, Alerts







 Substance  Reaction  Severity  Status

 

 morphine      Active

 

 Vicodin      Active

 

 Norco      Active







Medications

acetaminophen

650 mg, 2 tab, Route: PO, Drug form: TAB, Q4H, Dosing Weight 95.909, kg, PRN 
Pain Score 7-10, Start date: 18 8:51:00 CDT, Duration: 30 day, Stop date: 
18 8:50:00 CDT

Notes: Do not exceed 4 gm/day.  (Same as: Tylenol)

Start Date: 18

Stop Date: 18

Status: Discontinuedaspirin 325 mg tablet

325 mg=1 tab, PO, Daily, # 30 tab, 0 Refill(s), Pharmacy: [id number]

Start Date: 18

Stop Date: 18

Status: Discontinuedaspirin 325 mg tablet

325 mg=1 tab, PO, Daily, 0 Refill(s)

Start Date: 18

Status: Orderedaspirin 325 mg tablet

325 mg, 1 tab, Route: PO, Drug form: ECTAB, Daily, Dosing Weight 96.004, kg, 
Start date: 18 9:00:00 CDT, Duration: 30 day, Stop date: 18 9:00:00 
CDT

Notes: (Do Not Crush)  Do not crush or chew.

Start Date: 18

Stop Date: 18

Status: Discontinuedatorvastatin 10 mg oral tablet

10 mg=1 tab, PO, Bedtime, 0 Refill(s)

Start Date: 18

Status: Orderedatorvastatin 10 mg oral tablet

10 mg=1 tab, PO, Bedtime, # 30 tab, 0 Refill(s), Pharmacy: [id number]

Start Date: 18

Stop Date: 18

Status: DiscontinuedDextrose 50% Syringe

25 gm, 50 mL, Route: IVP, Drug Form: INJ, Dosing Weight 95.909, kg, PRN, PRN 
Blood Glucose Results, Start date: 18 9:46:00 CDT, Duration: 30 day, Stop 
date: 18 9:45:00 CDT

Start Date: 18

Stop Date: 18

Status: DiscontinuedDextrose 50% Syringe

12.5 gm, 25 mL, Route: IVP, Drug Form: INJ, Dosing Weight 95.909, kg, PRN, PRN 
Blood Glucose Results, Start date: 18 9:46:00 CDT, Duration: 30 day, Stop 
date: 18 9:45:00 CDT

Start Date: 18

Stop Date: 18

Status: DiscontinuedDextrose 50% Syringe

12.5 gm, 25 mL, Route: IVP, Drug Form: INJ, Dosing Weight 95.909, kg, PRN, PRN 
Blood Glucose Results, Start date: 18 13:48:00 CDT, Duration: 30 day, 
Stop date: 18 13:47:00 CDT

Start Date: 18

Stop Date: 4/3/18

Status: DeletedDextrose 50% Syringe

25 gm, 50 mL, Route: IVP, Drug Form: INJ, Dosing Weight 95.909, kg, PRN, PRN 
Blood Glucose Results, Start date: 18 13:48:00 CDT, Duration: 30 day, 
Stop date: 18 13:47:00 CDT

Start Date: 18

Stop Date: 4/3/18

Status: DeletedDextrose 50% Syringe

12.5 gm, 25 mL, Route: IVP, Drug Form: INJ, Dosing Weight 95.909, kg, PRN, PRN 
Blood Glucose Results, Start date: 18 15:30:00 CDT, Duration: 30 day, 
Stop date: 18 15:29:00 CDT

Start Date: 4/3/18

Stop Date: 18

Status: DiscontinuedDextrose 50% Syringe

25 gm, 50 mL, Route: IVP, Drug Form: INJ, Dosing Weight 95.909, kg, PRN, PRN 
Blood Glucose Results, Start date: 18 15:30:00 CDT, Duration: 30 day, 
Stop date: 18 15:29:00 CDT

Start Date: 4/3/18

Stop Date: 18

Status: DiscontinuedDilaudid

2 mg, 1 tab, Route: PO, Drug form: TAB, Q6H, Dosing Weight 95.909, kg, PRN Pain 
Score 4-6, Start date: 18 16:16:00 CDT, Duration: 30 day, Stop date:  16:15:00 CDT

Notes: (Same as: Dilaudid)

Start Date: 18

Stop Date: 18

Status: DiscontinuedglipiZIDE-metformin 2.5 mg-500 mg oral tablet

1 tab, Route: PO, Drug Form: TAB, Dosing Weight 95.909, kg, BID, Start date:  17:00:00 CDT, Duration: 30 day, Stop date: 18 9:00:00 CDT

Start Date: 18

Stop Date: 18

Status: DeletedglipiZIDE-metformin 2.5 mg-500 mg oral tablet

1 tab, PO, BID, # 60 tab, 0 Refill(s), Pharmacy: [id number]

Start Date: 18

Stop Date: 18

Status: DiscontinuedglipiZIDE-metformin 2.5 mg-500 mg oral tablet

1 tab, PO, BID, 0 Refill(s)

Start Date: 18

Status: Orderedglucagon

1 mg, Route: IM, Drug form: PDR/INJ, PRN, Dosing Weight 95.909, kg, PRN Blood 
Glucose Results, Startdate: 18 9:46:00 CDT, Duration: 30 day, Stop date: 
18 9:45:00 CDT

Start Date: 18

Stop Date: 18

Status: Discontinuedglucagon

1 mg, Route: IM, Drug form: PDR/INJ, PRN, Dosing Weight 95.909, kg, PRN Blood 
Glucose Results, Startdate: 18 13:48:00 CDT, Duration: 30 day, Stop date: 
18 13:47:00 CDT

Start Date: 18

Stop Date: 4/3/18

Status: Deletedglucagon

1 mg, Route: IM, Drug form: PDR/INJ, PRN, Dosing Weight 95.909, kg, PRN Blood 
Glucose Results, Startdate: 18 15:30:00 CDT, Duration: 30 day, Stop date: 
18 15:29:00 CDT

Start Date: 4/3/18

Stop Date: 18

Status: DiscontinuedGlucophage

500 mg, 1 tab, Route: PO, Drug form: TAB, BID-Meals, Start date: 18 17:00:
00 CDT, Duration: 30day, Stop date: 18 8:00:00 CDT

Notes: (Same as: Glucophage)  Take with meal

Start Date: 18

Stop Date: 18

Status: CanceledGlucotrol

2.5 mg, 0.5 tab, Route: PO, Drug form: TAB, BID-Before Meals, Start date:  16:30:00 CDT, Duration: 30 day, Stop date: 18 7:30:00 CDT

Start Date: 18

Stop Date: 18

Status: DiscontinuedImdur

30 mg, 1 tab, Route: PO, Drug form: ERTAB, QAM, Dosing Weight 95.909, kg, Start 
date: 18 10:30:00 CDT, Duration: 30 day, Stop date: 18 9:00:00 CDT

Notes: (Same as:Imdur)

Start Date: 4/3/18

Stop Date: 18

Status: Discontinuedinsulin lispro

3 unit, 0.03 mL, Route: SUB-Q, Drug form: SOLN, Bedtime, Dosing Weight 95.909, 
kg, PRN Blood GlucoseResults, Start date: 18 9:46:00 CDT, Duration: 30 day
, Stop date: 18 9:45:00 CDT

Notes: (Same as: Humalog ) Roll in palms of hands gently;  Do not shake `
vigorously. "Single PatientUse Only "  WASTE: F/P - Black; E - Municipal Trash 
Bin  Stable for 28 days at room temperature.Expires in _____ days from _________
_____Date

Start Date: 18

Stop Date: 18

Status: Discontinuedinsulin lispro

4 unit, 0.04 mL, Route: SUB-Q, Drug form: SOLN, Bedtime, Dosing Weight 95.909, 
kg, PRN Blood GlucoseResults, Start date: 18 9:46:00 CDT, Duration: 30 day
, Stop date: 18 9:45:00 CDT

Notes: (Same as: Humalog ) Roll in palms of hands gently;  Do not shake `
vigorously. "Single PatientUse Only "  WASTE: F/P - Black; E - Municipal Trash 
Bin  Stable for 28 days at room temperature.Expires in _____ days from _________
_____Date

Start Date: 18

Stop Date: 18

Status: Discontinuedinsulin lispro

2 unit, 0.02 mL, Route: SUB-Q, Drug form: SOLN, Bedtime, Dosing Weight 95.909, 
kg, PRN Blood GlucoseResults, Start date: 18 9:46:00 CDT, Duration: 30 day
, Stop date: 18 9:45:00 CDT

Notes: (Same as: Humalog ) Roll in palms of hands gently;  Do not shake `
vigorously. "Single PatientUse Only "  WASTE: F/P - Black; E - Municipal Trash 
Bin  Stable for 28 days at room temperature.Expires in _____ days from _________
_____Date

Start Date: 18

Stop Date: 18

Status: Discontinuedinsulin lispro

15 unit, 0.15 mL, Route: SUB-Q, Drug form: SOLN, TID-Before Meals, Dosing 
Weight 95.909, kg, PRN Blood Glucose Results, Start date: 18 9:46:00 CDT, 
Duration: 30 day, Stop date: 18 9:45:00 CDT

Notes: (Same as: Humalog ) Roll in palms of hands gently;  Do not shake `
vigorously. "Single PatientUse Only "  WASTE: F/P - Black; E - Municipal Trash 
Bin  Stable for 28 days at room temperature.Expires in _____ days from _________
_____Date

Start Date: 18

Stop Date: 18

Status: Discontinuedinsulin lispro

1 unit, 0.01 mL, Route: SUB-Q, Drug form: SOLN, Bedtime, Dosing Weight 95.909, 
kg, PRN Blood GlucoseResults, Start date: 18 9:46:00 CDT, Duration: 30 day
, Stop date: 18 9:45:00 CDT

Notes: (Same as: Humalog ) Roll in palms of hands gently;  Do not shake `
vigorously. "Single PatientUse Only "  WASTE: F/P - Black; E - Municipal Trash 
Bin  Stable for 28 days at room temperature.Expires in _____ days from _________
_____Date

Start Date: 18

Stop Date: 18

Status: Discontinuedinsulin lispro

6 unit, 0.06 mL, Route: SUB-Q, Drug form: SOLN, TID-Before Meals, Dosing Weight 
95.909, kg, PRN Blood Glucose Results, Start date: 18 9:46:00 CDT, 
Duration: 30 day, Stop date: 18 9:45:00 CDT

Notes: (Same as: Humalog ) Roll in palms of hands gently;  Do not shake `
vigorously. "Single PatientUse Only "  WASTE: F/P - Black; E - Municipal Trash 
Bin  Stable for 28 days at room temperature.Expires in _____ days from _________
_____Date

Start Date: 18

Stop Date: 18

Status: Discontinuedinsulin lispro

9 unit, 0.09 mL, Route: SUB-Q, Drug form: SOLN, TID-Before Meals, Dosing Weight 
95.909, kg, PRN Blood Glucose Results, Start date: 18 9:46:00 CDT, 
Duration: 30 day, Stop date: 18 9:45:00 CDT

Notes: (Same as: Humalog ) Roll in palms of hands gently;  Do not shake `
vigorously. "Single PatientUse Only "  WASTE: F/P - Black; E - Municipal Trash 
Bin  Stable for 28 days at room temperature.Expires in _____ days from _________
_____Date

Start Date: 18

Stop Date: 18

Status: Discontinuedinsulin lispro

3 unit, 0.03 mL, Route: SUB-Q, Drug form: SOLN, TID-Before Meals, Dosing Weight 
95.909, kg, PRN Blood Glucose Results, Start date: 18 9:46:00 CDT, 
Duration: 30 day, Stop date: 18 9:45:00 CDT

Notes: (Same as: Humalog ) Roll in palms of hands gently;  Do not shake `
vigorously. "Single PatientUse Only "  WASTE: F/P - Black; E - Municipal Trash 
Bin  Stable for 28 days at room temperature.Expires in _____ days from _________
_____Date

Start Date: 18

Stop Date: 18

Status: Discontinuedinsulin lispro

12 unit, 0.12 mL, Route: SUB-Q, Drug form: SOLN, TID-Before Meals, Dosing 
Weight 95.909, kg, PRN Blood Glucose Results, Start date: 18 9:46:00 CDT, 
Duration: 30 day, Stop date: 18 9:45:00 CDT

Notes: (Same as: Humalog ) Roll in palms of hands gently;  Do not shake `
vigorously. "Single PatientUse Only "  WASTE: F/P - Black; E - Municipal Trash 
Bin  Stable for 28 days at room temperature.Expires in _____ days from _________
_____Date

Start Date: 18

Stop Date: 18

Status: Discontinuedinsulin lispro

4 unit, 0.04 mL, Route: SUB-Q, Drug form: SOLN, Bedtime, Dosing Weight 95.909, 
kg, PRN Blood GlucoseResults, Start date: 18 13:48:00 CDT, Duration: 30 
day, Stop date: 18 13:47:00 CDT

Notes: (Same as: Humalog ) Roll in palms of hands gently;  Do not shake `
vigorously. "Single PatientUse Only "  WASTE: F/P - Black; E - Municipal Trash 
Bin  Stable for 28 days at room temperature.Expires in _____ days from _________
_____Date

Start Date: 18

Stop Date: 18

Status: Discontinuedinsulin lispro

3 unit, 0.03 mL, Route: SUB-Q, Drug form: SOLN, Bedtime, Dosing Weight 95.909, 
kg, PRN Blood GlucoseResults, Start date: 18 13:48:00 CDT, Duration: 30 
day, Stop date: 18 13:47:00 CDT

Notes: (Same as: Humalog ) Roll in palms of hands gently;  Do not shake `
vigorously. "Single PatientUse Only "  WASTE: F/P - Black; E - Municipal Trash 
Bin  Stable for 28 days at room temperature.Expires in _____ days from _________
_____Date

Start Date: 18

Stop Date: 18

Status: Discontinuedinsulin lispro

2 unit, 0.02 mL, Route: SUB-Q, Drug form: SOLN, Bedtime, Dosing Weight 95.909, 
kg, PRN Blood GlucoseResults, Start date: 18 13:48:00 CDT, Duration: 30 
day, Stop date: 18 13:47:00 CDT

Notes: (Same as: Humalog ) Roll in palms of hands gently;  Do not shake `
vigorously. "Single PatientUse Only "  WASTE: F/P - Black; E - Municipal Trash 
Bin  Stable for 28 days at room temperature.Expires in _____ days from _________
_____Date

Start Date: 18

Stop Date: 18

Status: Discontinuedinsulin lispro

1 unit, 0.01 mL, Route: SUB-Q, Drug form: SOLN, Bedtime, Dosing Weight 95.909, 
kg, PRN Blood GlucoseResults, Start date: 18 13:48:00 CDT, Duration: 30 
day, Stop date: 18 13:47:00 CDT

Notes: (Same as: Humalog ) Roll in palms of hands gently;  Do not shake `
vigorously. "Single PatientUse Only "  WASTE: F/P - Black; E - Municipal Trash 
Bin  Stable for 28 days at room temperature.Expires in _____ days from _________
_____Date

Start Date: 18

Stop Date: 18

Status: Discontinuedinsulin lispro

5 unit, 0.05 mL, Route: SUB-Q, Drug form: SOLN, TID-Before Meals, Dosing Weight 
95.909, kg, PRN Blood Glucose Results, Start date: 18 13:48:00 CDT, 
Duration: 30 day, Stop date: 18 13:47:00 CDT

Notes: (Same as: Humalog ) Roll in palms of hands gently;  Do not shake `
vigorously. "Single PatientUse Only "  WASTE: F/P - Black; E - Municipal Trash 
Bin  Stable for 28 days at room temperature.Expires in _____ days from _________
_____Date

Start Date: 18

Stop Date: 4/3/18

Status: Discontinuedinsulin lispro

2 unit, 0.02 mL, Route: SUB-Q, Drug form: SOLN, TID-Before Meals, Dosing Weight 
95.909, kg, PRN Blood Glucose Results, Start date: 18 13:48:00 CDT, 
Duration: 30 day, Stop date: 18 13:47:00 CDT

Notes: (Same as: Humalog ) Roll in palms of hands gently;  Do not shake `
vigorously. "Single PatientUse Only "  WASTE: F/P - Black; E - Municipal Trash 
Bin  Stable for 28 days at room temperature.Expires in _____ days from _________
_____Date

Start Date: 18

Stop Date: 4/3/18

Status: Discontinuedinsulin lispro

3 unit, 0.03 mL, Route: SUB-Q, Drug form: SOLN, TID-Before Meals, Dosing Weight 
95.909, kg, PRN Blood Glucose Results, Start date: 18 13:48:00 CDT, 
Duration: 30 day, Stop date: 18 13:47:00 CDT

Notes: (Same as: Humalog ) Roll in palms of hands gently;  Do not shake `
vigorously. "Single PatientUse Only "  WASTE: F/P - Black; E - Municipal Trash 
Bin  Stable for 28 days at room temperature.Expires in _____ days from _________
_____Date

Start Date: 18

Stop Date: 4/3/18

Status: Discontinuedinsulin lispro

1 unit, 0.01 mL, Route: SUB-Q, Drug form: SOLN, TID-Before Meals, Dosing Weight 
95.909, kg, PRN Blood Glucose Results, Start date: 18 13:48:00 CDT, 
Duration: 30 day, Stop date: 18 13:47:00 CDT

Notes: (Same as: Humalog ) Roll in palms of hands gently;  Do not shake `
vigorously. "Single PatientUse Only "  WASTE: F/P - Black; E - Municipal Trash 
Bin  Stable for 28 days at room temperature.Expires in _____ days from _________
_____Date

Start Date: 18

Stop Date: 4/3/18

Status: Discontinuedinsulin lispro

4 unit, 0.04 mL, Route: SUB-Q, Drug form: SOLN, TID-Before Meals, Dosing Weight 
95.909, kg, PRN Blood Glucose Results, Start date: 18 13:48:00 CDT, 
Duration: 30 day, Stop date: 18 13:47:00 CDT

Notes: (Same as: Humalog ) Roll in palms of hands gently;  Do not shake `
vigorously. "Single PatientUse Only "  WASTE: F/P - Black; E - Municipal Trash 
Bin  Stable for 28 days at room temperature.Expires in _____ days from _________
_____Date

Start Date: 18

Stop Date: 4/3/18

Status: Discontinuedinsulin lispro

2 unit, 0.02 mL, Route: SUB-Q, Drug form: SOLN, TID-Before Meals, Dosing Weight 
95.909, kg, PRN Blood Glucose Results, Start date: 18 15:30:00 CDT, 
Duration: 30 day, Stop date: 18 15:29:00 CDT

Notes: (Same as: Humalog ) Roll in palms of hands gently;  Do not shake `
vigorously. "Single PatientUse Only "  WASTE: F/P - Black; E - Municipal Trash 
Bin  Stable for 28 days at room temperature.Expires in _____ days from _________
_____Date

Start Date: 4/3/18

Stop Date: 18

Status: Discontinuedinsulin lispro

4 unit, 0.04 mL, Route: SUB-Q, Drug form: SOLN, TID-Before Meals, Dosing Weight 
95.909, kg, PRN Blood Glucose Results, Start date: 18 15:30:00 CDT, 
Duration: 30 day, Stop date: 18 15:29:00 CDT

Notes: (Same as: Humalog ) Roll in palms of hands gently;  Do not shake `
vigorously. "Single PatientUse Only "  WASTE: F/P - Black; E - Municipal Trash 
Bin  Stable for 28 days at room temperature.Expires in _____ days from _________
_____Date

Start Date: 4/3/18

Stop Date: 18

Status: Discontinuedinsulin lispro

6 unit, 0.06 mL, Route: SUB-Q, Drug form: SOLN, TID-Before Meals, Dosing Weight 
95.909, kg, PRN Blood Glucose Results, Start date: 18 15:30:00 CDT, 
Duration: 30 day, Stop date: 18 15:29:00 CDT

Notes: (Same as: Humalog ) Roll in palms of hands gently;  Do not shake `
vigorously. "Single PatientUse Only "  WASTE: F/P - Black; E - Municipal Trash 
Bin  Stable for 28 days at room temperature.Expires in _____ days from _________
_____Date

Start Date: 4/3/18

Stop Date: 18

Status: Discontinuedinsulin lispro

8 unit, 0.08 mL, Route: SUB-Q, Drug form: SOLN, TID-Before Meals, Dosing Weight 
95.909, kg, PRN Blood Glucose Results, Start date: 18 15:30:00 CDT, 
Duration: 30 day, Stop date: 18 15:29:00 CDT

Notes: (Same as: Humalog ) Roll in palms of hands gently;  Do not shake `
vigorously. "Single PatientUse Only "  WASTE: F/P - Black; E - Municipal Trash 
Bin  Stable for 28 days at room temperature.Expires in _____ days from _________
_____Date

Start Date: 4/3/18

Stop Date: 18

Status: Discontinuedinsulin lispro

10 unit, 0.1 mL, Route: SUB-Q, Drug form: SOLN, TID-Before Meals, Dosing Weight 
95.909, kg, PRN Blood Glucose Results, Start date: 18 15:30:00 CDT, 
Duration: 30 day, Stop date: 18 15:29:00 CDT

Notes: (Same as: Humalog ) Roll in palms of hands gently;  Do not shake `
vigorously. "Single PatientUse Only "  WASTE: F/P - Black; E - Municipal Trash 
Bin  Stable for 28 days at room temperature.Expires in _____ days from _________
_____Date

Start Date: 4/3/18

Stop Date: 18

Status: Discontinuedisosorbide mononitrate 30 mg oral tablet, extended release

30 mg=1 tab, PO, QAM, 0 Refill(s)

Start Date: 18

Status: Orderedisosorbide mononitrate 30 mg oral tablet, extended release

30 mg=1 tab, PO, QAM, # 30 tab, 0 Refill(s), Pharmacy: [id number]

Start Date: 18

Stop Date: 18

Status: DiscontinuedketOROLAC

30 mg, 1 mL, Route: IV, Drug form: INJ, Q6H, Dosing Weight 95.909, kg, PRN Pain 
Score 6-10, Start date: 18 10:41:00 CDT, Duration: 4 day, Stop date:  10:40:00 CDT

Notes: (Same as:Toradol) IV bolus must be given &gt;15 seconds. Give IM 
administration slowly and deeply into the muscle.Not for use &gt; 4 days  *** 
MEDICATION WASTE ***Product Size:  30 mgProduct Wasted:  ___ mg

Start Date: 18

Stop Date: 18

Status: DiscontinuedLipitor

10 mg, 1 tab, Route: PO, Drug form: TAB, Bedtime, Dosing Weight 95.909, kg, 
Start date: 18 21:00:00 CDT, Duration: 30 day, Stop date: 18 21:00:
00 CDT

Notes: (Same As: Lipitor)

Start Date: 4/3/18

Stop Date: 18

Status: DiscontinuedLovenox

40 mg, 0.4 mL, Route: SUB-Q, Drug form: INJ, [id number], Dosing Weight 95.909, kg
, Start date: 18 11:00:00 CDT, Duration: 30 day, Stop date: 18 11:00
:00 CDT

Notes: (Same as: Lovenox)

Start Date: 4/3/18

Stop Date: 18

Status: Discontinuedmetoprolol tartrate

25 mg, 1 tab, Route: PO, Drug form: TAB, Q12H, Dosing Weight 96.004, kg, Start 
date: 18 9:00:00 CDT, Duration: 30 day, Stop date: 18 21:00:00 CDT

Notes: (Same as: Lopressor)

Start Date: 18

Stop Date: 18

Status: Discontinuedmetoprolol tartrate 25 mg oral tablet

25 mg=1 tab, PO, Q12H, 0 Refill(s)

Start Date: 18

Status: Orderedmetoprolol tartrate 25 mg oral tablet

25 mg=1 tab, PO, Q12H, # 60 tab, 0 Refill(s), Pharmacy: [facility]

Start Date: 18

Stop Date: 18

Status: Discontinuedpantoprazole 40 mg oral enteric coated tablet

40 mg=1 tab, PO, Daily, 0 Refill(s)

Start Date: 18

Status: Orderedpantoprazole 40 mg oral enteric coated tablet

40 mg=1 tab, PO, Daily, # 30 tab, 0 Refill(s), Pharmacy: [facility]

Start Date: 18

Stop Date: 18

Status: DiscontinuedPrenatal Multivitamins with Folic Acid 0.8 mg oral tablet

1 tab, Route: PO, Drug Form: TAB, Dosing Weight 95.909, kg, Daily, Start date: 
18 9:00:00 CDT,Duration: 30 day, Stop date: 18 9:00:00 CDT

Start Date: 18

Stop Date: 18

Status: DiscontinuedPreNexa premier with DHA

1 cap, Route: PO, Dosing Weight 95.909, kg, Daily, Start date: 18 9:00:00 
CDT, Duration: 30 day, Stop date: 18 9:00:00 CDT

Start Date: 18

Stop Date: 4/3/18

Status: DeletedProtonix

40 mg, 1 tab, Route: PO, Drug form: ECTAB, Daily, Dosing Weight 95.909, kg, 
Start date: 18 9:00:00 CDT, Duration: 30 day, Stop date: 18 9:00:00 
CDT

Notes: Tablet should not be chewed or crushed.(Same as: Protonix)

Start Date: 4/3/18

Stop Date: 18

Status: DiscontinuedSodium Chloride 0.9%  mL

750 mL, Rate: 75 ml/hr, Infuse over: 10 hr, Route: IV, Dosing Weight 95.909 kg, 
Total Volume: 750, Start date: 18 8:51:00 CDT, Duration: 10 hr, Stop date
: 18 18:50:00 CDT, 2.14, m2

Start Date: 18

Stop Date: 18

Status: CompletedTylenol with Codeine #3 oral tablet

1 tab, Route: PO, Drug Form: TAB, Dosing Weight 95.909, kg, Q4H, PRN Pain Score 
1-3, Start date: 18 10:12:00 CDT, Duration: 30 day, Stop date: 18 10
:11:00 CDT

Start Date: 18

Stop Date: 18

Status: DiscontinuedVisipaque

75 mL, Route: IV, Drug form: SOLN, ONCE, Dosing Weight 95.909, kg, Start date: 
18 10:37:00 CDT, Stop date: 18 10:37:00 CDT

Notes: (Same as: Visipaque).WASTE: F/P - Black; E - Municipal Trash Bin

Start Date: 18

Stop Date: 18

Status: Completed



Results

ELECTROLYTES





 Most recent to oldest [Reference Range]:  1  2  3

 

 Sodium Lvl [135-145 mEq/L]  134 mEq/L    



   *LOW*    



   (4/3/18 4:38 AM)    

 

 Potassium Lvl [3.5-5.1 mEq/L]  3.8 mEq/L    



   (4/3/18 4:38 AM)    

 

 Chloride Lvl [ mEq/L]  103 mEq/L    



   (4/3/18 4:38 AM)    

 

 CO2 [24-32 mEq/L]  21 mEq/L    



   *LOW*    



   (4/3/18 4:38 AM)    

 

 AGAP [10.0-20.0 mEq/L]  13.8 mEq/L    



   (4/3/18 4:38 AM)    



CHEM PANEL





 Most recent to oldest [Reference Range]:  1  2  3

 

 Creatinine Lvl [0.50-1.40 mg/dL]  0.56 mg/dL  0.65 mg/dL  



   (4/3/18 4:38 AM)  (18 9:41 AM)  

 

 eGFR  128 mL/min/1.73m2 1  120 mL/min/1.73m2 2  



   *NA*  *NA*  



   (4/3/18 4:38 AM)  (18 9:41 AM)  

 

 BUN [7-22 mg/dL]  12 mg/dL    



   (4/3/18 4:38 AM)    

 

 Glucose Lvl [70-99 mg/dL]  240 mg/dL    



   *HI*    



   (4/3/18 4:38 AM)    

 

 Calcium Lvl [8.5-10.5 mg/dL]  8.2 mg/dL    



   *LOW*    



   (4/3/18 4:38 AM)    



1Result Comment: The eGFR is calculated using the CKD-EPI formula. In most young
, healthy individualsthe eGFR will be &gt;90 mL/min/1.73m2. The eGFR declines 
with age. An eGFR of 60-89 may be normal insome populations, particularly the 
elderly, for whom the CKD-EPI formula has not been extensively validated. Use 
of the eGFR is not recommended in the following populations:



Individuals with unstable creatinine concentrations, including pregnant 
patients and those with serious co-morbid conditions.



Patients with extremes in muscle mass or diet.



The data above are obtained from the National Kid[employer] Disease Education Program (
NKDEP) which additionally recommends that when the eGFR is used in patients 
with extremes of body mass index for purposesof drug dosing, the eGFR should be 
multiplied by the estimated BMI.2Result Comment: The eGFR is calculated using 
the CKD-EPI formula. In most young, healthy individualsthe eGFR will be &gt;90 
mL/min/1.73m2. The eGFR declines with age. An eGFR of 60-89 may be normal 
insome populations, particularly the elderly, for whom the CKD-EPI formula has 
not been extensively validated. Use of the eGFR is not recommended in the 
following populations:



Individuals with unstable creatinine concentrations, including pregnant 
patients and those with serious co-morbid conditions.



Patients with extremes in muscle mass or diet.



The data above are obtained from the National Kidney Disease Education Program (
NKDEP) which additionally recommends that when the eGFR is used in patients 
with extremes of body mass index for purposesof drug dosing, the eGFR should be 
multiplied by the estimated BMI.CARDIAC ENZYMES





 Most recent to oldest  1  2  3



 [Reference Range]:      

 

 Troponin-I [0.00-0.40 ng/mL]  11.00 ng/mL 1  1.90 ng/mL 2  0.08 ng/mL



   *CRIT*  *CRIT*  (18 9:41 AM)



   (4/3/18 4:38 AM)  (18 3:37 PM)  



1Result Comment: Critical Result(s) called to Alexis Ennis at 2018 05:53 
by BE.  Read back OK.2Result Comment: Critical Result(s) called to Linda 
Serafin at 2018 16:38 by MS.  Read back OK.HEMATOLOGY





 Most recent to oldest [Reference Range]:  1  2  3

 

 WBC [3.7-10.4 K/CMM]  10.2 K/CMM    



   (4/3/18 4:38 AM)    

 

 RBC [4.70-6.10 M/CMM]  5.24 M/CMM    



   (4/3/18 4:38 AM)    

 

 Hgb [14.0-18.0 g/dL]  15.2 g/dL  15.0 g/dL  



   (4/3/18 4:38 AM)  (18 9:41 AM)  

 

 Hct [42.0-54.0 %]  43.1 %    



   (4/3/18 4:38 AM)    

 

 MCV [80.0-94.0 fL]  82.3 fL    



   (4/3/18 4:38 AM)    

 

 MCH [27.0-31.0 pg]  29.1 pg    



   (4/3/18 4:38 AM)    

 

 MCHC [32.0-36.0 g/dL]  35.4 g/dL    



   (4/3/18 4:38 AM)    

 

 RDW [11.5-14.5 %]  13.5 %    



   (4/3/18 4:38 AM)    

 

 MPV [7.4-10.4 fL]  8.0 fL    



   (4/3/18 4:38 AM)    

 

 Platelet [133-450 K/CMM]  220 K/CMM    



   (4/3/18 4:38 AM)    

 

 Segs [45.0-75.0 %]  68.3 %    



   (4/3/18 4:38 AM)    

 

 Lymphocytes [20.0-40.0 %]  21.3 %    



   (4/3/18 4:38 AM)    

 

 Monocytes [2.0-12.0 %]  8.2 %    



   (4/3/18 4:38 AM)    

 

 Eosinophils [0.0-4.0 %]  1.7 %    



   (4/3/18 4:38 AM)    

 

 Basophils [0.0-1.0 %]  0.5 %    



   (4/3/18 4:38 AM)    

 

 Segs-Bands # [1.5-8.1 K/CMM]  7.0 K/CMM    



   (4/3/18 4:38 AM)    

 

 Lymphocytes # [1.0-5.5 K/CMM]  2.2 K/CMM    



   (4/3/18 4:38 AM)    

 

 Monocytes # [0.0-0.8 K/CMM]  0.8 K/CMM    



   (4/3/18 4:38 AM)    

 

 Eosinophils # [0.0-0.5 K/CMM]  0.2 K/CMM    



   (4/3/18 4:38 AM)    

 

 Basophils # [0.0-0.2 K/CMM]  0.1 K/CMM    



   (4/3/18 4:38 AM)    

 

 PT [12.0-14.7 seconds]  13.3 seconds    



   (4/3/18 4:38 AM)    

 

 INR [0.85-1.17]  1.01    



   (4/3/18 4:38 AM)    

 

 PTT [22.9-35.8 seconds]  28.8 seconds    



   (4/3/18 4:38 AM)    







Immunizations

No data available for this section



Procedures







 Procedure  Date  Related Diagnosis  Body Site  Status

 

 Closure of colostomy        Completed

 

 Hernia repair        Completed







Social History







 Social History Type  Response

 

 Alcohol  Type Liquor.  Frequency: 1-2 times per month.

 

 Smoking Status  Never smoker; Previous treatment: None; Ready to change: No; 
Concerns about tobacco use in household: No; Exposure to Tobacco Smoke None; 
Cigarette Smoking Last 365 Days No; Reg Smoking Cessation Counseling No



   entered on: 18







Assessment and Plan

Extracted from:





 Title: Medical Consultation Progress Note  Author: Tanya Henlsey MD  Date
: 18



 *    









  Impression and Plan



 ASSESSMENT:



 Suspected ST Elevation on EKG on admission- -no signs of CAD on LHC



 Non-Q MI



 HTN



 DM2



 Hx of Hiatal Hernia s/p Repair



 Morgagnis Hernia



 



 PLAN:



 Defer chest pain workup to Cardiology w



 Pain meds orn



 Insulin SS; adjusted as blood sugars elevated



 GI consult for hernia; pt went for Barium Swallow which results are relatively 
normal. Follow up with GI outpatient



 



 Thank you for allowing us to participate in the care of this pt.  Will 
continue to follow along.





Extracted from:





 Title: Clinical Document  Author: Rohini Gonzales MD  Date: 18









 Initial Consult Note



 Gastroenterology and Hepatology



 



 



 Reason for Consultation:



 Chest pain



 



 Assessment/Impression:



 1.  Atypical chest pain with a negative heart catheterization.  Differential 
diagnoses at this time include severe reflux esophagitis versus esophageal 
spasm.  It is unlikely that hernia can cause this



 amount of pain unless it is obstructed and does not appear that it is 
obstructed given that he is able to swallow his saliva and is not having any 
trouble swallowing food.  Pericarditis can also cause chest pain on occasion.



 2.  Abnormal CT, Morgagni hernia



 3.  Diabetes appears to be controlled



 



 Plan:



 -Discussed the differential diagnosis with the patient.  I have proposed an 
upper endoscopy to evaluate the use of flatus for any esophagitis or and rule 
out large incarcerated hernia.  Risks and benefi



 ts of the procedure discussed with the patient as well as family at bedside.  
Also discussed plan with the cardiologist who is in agreement.



 -GI prophylaxis with PPI daily



 -Pain management deferred to primary



 



 Than you for asking us to part spent in the care of this patient.  We will 
follow along and provide further recommendations as needed.



 



 History Of Present Illness:



 This is a 42-year-old male with past medical history of hypertension, diabetes
, prior history of diverticulitis had prior colostomy and reversal done at 
[facility] 5-6 years ago presented to the emergency room at [facility]



  early this morning with acute onset midsternal chest pain and described it as 
a crushing sensation with as somebody sitting in her chest on his chest.  He 
was emergently transferred for suspected ST el



 evation MI based on abnormal EKGs to [facility] for emergent 
heart catheterization which she underwent today.  Findings of cath were 
reviewed as well as discussed with the cardiologist carleen peña over the phone with minimal plaque obstruction with EF of 50%.  On heart 
catheterization the cardiologist noted a shadow on the right side border of the 
heart and therefore ordered a CT chestWith f



 indings of herniation of fat into the anterior mediastinum through an anterior 
diaphragmatic defect consistent with Morgagni hernia.  On questioning patient 
he denied any chronic chest pain, chronic aci



 d reflux no prior mention of hiatal hernia.  Eyes any trauma to chest or 
lifting heavy objects with the last 24-48 hours prior to onset of symptoms.  
Denies any cough phlegm or shortness of breath at th



 is time.  It does not appear that his chest pain is getting worse with 
exertion.  Does have mild worsening discomfort after eating.



 



 Past Medical History:



 DM (diabetes mellitus)



 Obesity



 Diverticulitis



 



 



 Scheduled Meds (3):



 18 aspirin (aspirin 325 mg tablet) 325 mg PO Daily



 18 metoprolol (metoprolol tartrate) 25 mg PO Q12H



 18 pantoprazole (Protonix) 40 mg PO Daily



 



 Continuous Infusions (1):



 18 Sodium Chloride 0.9%  mL 750 mL 75 ml/hr



 



 



 Allergies (3) Active  Reaction



 Vicodin None documented



 Norco None documented



 morphine None documented



 



 Social History:



 



 Alcohol



 Details: Type Liquor.  Frequency: 1-2 times per month.



 Tobacco



 Details: Use: Never smoker.  Previous treatment: None.  Ready to change: No.  
Household tobacco concerns: No.  Tobacco smoke exposure: None.  Did the Patient 
Smoke Cigarettes Anytime During the Last 365 Days? No.  Cessation Counseling 
Provided? No.



 



 Family History:



 



 No qualifying data available



 



 Procedure History:



 



 Closure of colostomy



 Hernia repair



 Review of Systems:



 NEGATIVE unless bold



 General: weight loss, loss of appetite, fever, chills, excessive malaise, 
fatigue, generalized weakness



 HEENT : recent change in vison, eye pain, diplopia, epistaxis, sinus pain, 
sore throat, throat pain, acute hearing loss, ear pain or discharge



 RESPIRATORY: shortness of breath, hemoptysis, cough, wheezing, pleuritic pains



 CVS: chest pain, palpitations, irregular herat beat, low extremity swelling, 
heart murmurs



 GI: see HPI



 : hematuria, dysuria, incontinence,urinary frequency, impaired urine flow. 
recurrent UTIs



 MS: acute arthritis, back pain, joint swelling, gout



 Neurological: acute altered mentation, headaches, recent seizures, falls, 
recent loss of consciousness, gait problems, focal limb weakness, numbness, 
tingling , paraesthesia



 Endocrine: polydypsia, polyuria, unusual hair loss



 Immunological/ Hematological; acute bleeding, easy bleeding or bruising, lymph 
node swelling, recurrent infections



 Psychiatric: hallucinations. psychosis, confusion, depression, suicidal 
ideation



 Integument: rash, jaundice, generalized



 



 



 Vitals and Temp:



 



 Vitals Tmp(F) Pulse BP RR SpO2 FIO2



  16:19 98.5 73 109/70 16 95 ---



  12:15 97.9 80 128/86 16 100 ---



  10:30 97.9 80 124/86 16 100 ---



  09:05 97.9 80 130/88 16 99 ---



  08:35 97.9 72 141/89 16 99 ---



 



 24 Hr Tmax: 98.5F (36.94c) at  16:19  Vital Signs are the last 5 in the 
past 48 hours.



 



 Examination:



 Vitals: See above



 General: NAD



 Psych: alert ; ortiented x 3



 Neck:supple



 CVS: s1s2 RRR



 Resp: CTA Bilaterally



 Abd : Soft, NT, ND , BS +



 Ext : No edema



 Skin: No rash



 CNS:  No gross motor/sensory defect



 



 Labs:



 



 Labs (Last four charted values)



 Hgb                  15.0 ()



 Cr                   0.65 ()



 Troponin             C 1.90 () 0.08 ()



 Diagnostic Studies:



 Reviewed.

## 2018-11-14 NOTE — XMS REPORT
Continuity of Care Document

 Created on:2018



Patient:DASHA HINOJOSA

Sex:Male

:1975

External Reference #:[id number]





Demographics







 Address  [address] [place] [address]

 

 Phone  [phone]

 

 Phone  [phone]

 

 Preferred Language  Unknown

 

 Marital Status  Unknown

 

 Pentec[employer]alism Affiliation  Unknown

 

 Race  Unknown

 

 Ethnic Group  Unknown









Author







 Organization  [facility]









Problems







 Problem  Status  Onset  Classification  Date  Comments  Source



     Date    Reported    



             



             



             

 

 Non-ST elevation    20    



 myocardial    18        [facility]



 infarction            



             

 

 ST elevation    04/10/20    2018    [facility]



 myocardial    18        



 infarction of            



 unspecified site            



             



             

 

 STEMI  Active  20        



     18        [facility]



             



             

 

 CHEST PAIN  Active  20        



     18        [facility]



             [facility]



             [place]



             



             

 

 Discharge    20    [facility]



 Diagnosis: Acute    17        



 Hyperglycemia            



             



             

 

 LOW BLOOD SUGAR  Active  20        [facility]



     17        [facility]



             



             

 

 ABDOMINAL PAIN  Active  20        



     14        [facility]



             



             

 

 Discharge    20    



 Diagnosis:    14        [facility]



 Costochondritis,            



 Chest wall pain            



             



             

 

 EXCESSIVE  Active  11/15/20        



 URINATION    11        [facility]



             



             

 

 Diverticulitis  Resolved    Problem  2018    [facility]



             



             

 

 DM (<span  Resolved    Problem  2018    



 ID="[id number]">            [place]



 Confirmed</span>)            [facility]



             



             

 

 Obesity  Resolved    Problem  2018    [facility]



             



             

 

 Type 2 diabetes        2018    



 mellitus without            [facility]



 complications            [facility]



             



             

 

 Obesity,        2018    



 unspecified            [facility]



             



             

 

 Essential        2018    



 hypertension            [place],



             [facility]



             



             

 

 Diaphragmatic        2018    



 hernia without            [facility]



 obstruction or            



 gangrene            



             

 

 Long term use of        2018    



 oral hypoglycemic            [facility]



 drugs            [facility]



             



             

 

 Body mass index        2018    



 34.0-34.9, adult            [facility]



             



             

 

 CHEST PAIN,  Active          



 UNSPECIFIED            [facility]



             



             







Medications







 Medication  Details  Route  Status  Patient  Ordering  Order  Source



         Instructions  Provider  Date  



               



               



               

 

 Glipizide 2.5  1 tab, Route:    Inactive        



 MG / Metformin  PO, Drug Form:            [facility]



 hydrochloride  TAB, Dosing            



 500 MG Oral  Weight 95.909,            



 Tablet  kg, BID, Start            



   date: 18            



   17:00:00 CDT,            



   Duration: 30            



   day, Stop date:            



   18 9:00:00            



   CDT            



               

 

 Glucophage  500 mg, 1 tab,    Inactive      



   Route: PO, Drug            [facility]



   form: TAB,            



   BID-Meals, Start            



   date: 18            



   17:00:00 CDT,            



   Duration: 30            



   day, Stop date:            



   18 8:00:00            



   CDTNotes: (Same            



   as: Glucophage)            



   Take with meal            



               



               

 

 Glucotrol  2.5 mg, 0.5 tab,    Inactive      



   Route: PO, Drug            [facility]



   form: TAB,            



   BID-Before            



   Meals, Start            



   date: 18            



   16:30:00 CDT,            



   Duration: 30            



   day, Stop date:            



   18 7:30:00            



   CDT            



               

 

 metoprolol  25 mg=1 tab, PO,    Active      



 tartrate 25 mg  Q12H, 0            [facility]



 oral tablet  Refill(s)            



               



               

 

 pantoprazole 40  40 mg=1 tab, PO,    Active      



 mg oral enteric  Daily, 0          2018  [facility]



 coated tablet  Refill(s)            



               



               

 

 isosorbide  30 mg=1 tab, PO,    Active        



 mononitrate 30  QAM, 0 Refill(s)            [facility]



 mg oral tablet,              



 extended              



 release              



               

 

 atorvastatin 10  10 mg=1 tab, PO,    Active        [facility]



 mg oral tablet  Bedtime, 0            [facility]



   Refill(s)            



               



               

 

 aspirin 325 mg  325 mg=1 tab,    Active        [facility]



 tablet  PO, Daily, 0          2018  [facility]



   Refill(s)            



               



               

 

 Glipizide 2.5  1 tab, PO, BID,    Active        [facility]



 MG / Metformin  0 Refill(s)            [facility]



 hydrochloride              



 500 MG Oral              



 Tablet              



               

 

 Glipizide 2.5  1 tab, PO, BID,    Inactive        [facility]



 MG / Metformin  # 60 tab, 0          2018  [facility]



 hydrochloride  Refill(s),            



 500 MG Oral  Pharmacy:            



 Tablet  [facility]            



   [id number]            



               

 

 aspirin 325 mg  325 mg=1 tab,    Inactive      /  [facility]



 tablet  PO, Daily, # 30          2018  [facility]



   tab, 0            



   Refill(s),            



   Pharmacy:            



   [facility]            



   [id number]            



               

 

 atorvastatin 10  10 mg=1 tab, PO,    Inactive        [facility]



 mg oral tablet  Bedtime, # 30          2018  [facility]



   tab, 0            



   Refill(s),            



   Pharmacy:            



   [facility]            



   [id number]            



               

 

 isosorbide  30 mg=1 tab, PO,    Inactive        



 mononitrate 30  QAM, # 30 tab, 0          2018  [facility]



 mg oral tablet,  Refill(s),            



 extended  Pharmacy:            



 release  [facility]            



   [id number]            



               

 

 metoprolol  25 mg=1 tab, PO,    Inactive      



 tartrate 25 mg  Q12H, # 60 tab,            [facility]



 oral tablet  0 Refill(s),            



   Pharmacy:            



   [facility]            



   [id number]            



               

 

 pantoprazole 40  40 mg=1 tab, PO,    Inactive      



 mg oral enteric  Daily, # 30 tab,            [facility]



 coated tablet  0 Refill(s),            



   Pharmacy:            



   [facility]            



   [id number]            



               

 

 Dextrose 50%  25 gm, 50 mL,    Inactive        



 Syringe  Route: IVP, Drug            [facility]



   Form: INJ,            



   Dosing Weight            



   95.909, kg, PRN,            



   PRN Blood            



   Glucose Results,            



   Start date:            



   18 9:46:00            



   CDT, Duration:            



   30 day, Stop            



   date: 18            



   9:45:00 CDT            



               



               

 

 Glucagon  1 mg, Route: IM,    Inactive      



   Drug form:            [facility]



   PDR/INJ, PRN,            



   Dosing Weight            



   95.909, kg, PRN            



   Blood Glucose            



   Results, Start            



   date: 18            



   9:46:00 CDT,            



   Duration: 30            



   day, Stop date:            



   18 9:45:00            



   CDT            



               

 

 Insulin Lispro  3 unit, 0.03 mL,    Inactive      



   Route: SUB-Q,            [facility]



   Drug form: SOLN,            



   Bedtime, Dosing            



   Weight 95.909,            



   kg, PRN Blood            



   Glucose Results,            



   Start date:            



   18 9:46:00            



   CDT, Duration:            



   30 day, Stop            



   date: 18            



   9:45:00            



   CDTNotes: (Same            



   as: Humalog )            



   Roll in palms of            



   hands gently;            



   Do not shake            



   `vigorously.            



   "Single Patient            



   Use Only "            



   WASTE: F/P -            



   Black; E -            



   Municipal Trash            



   Bin  Stable for            



   28 days at room            



   temperature.            



   Expires in _____            



   days from            



   ______________Da            



   te            



               

 

 PreNexa premier  1 cap, Route:    No Longer      



 with DHA  PO, Dosing    Active        [facility]



   Weight 95.909,            



   kg, Daily, Start            



   date: 18            



   9:00:00 CDT,            



   Duration: 30            



   day, Stop date:            



   18 9:00:00            



   CDT            



               

 

 Prenatal  1 tab, Route:    Inactive      



 Multivitamins  PO, Drug Form:            [facility]



 with Folic Acid  TAB, Dosing            



 0.8 mg oral  Weight 95.909,            



 tablet  kg, Daily, Start            



   date: 18            



   9:00:00 CDT,            



   Duration: 30            



   day, Stop date:            



   18 9:00:00            



   CDT            



               

 

 Lipitor  10 mg, 1 tab,    No Longer      



   Route: PO, Drug    Active        [facility]



   form: TAB,            



   Bedtime, Dosing            



   Weight 95.909,            



   kg, Start date:            



   18            



   21:00:00 CDT,            



   Duration: 30            



   day, Stop date:            



   18            



   21:00:00            



   CDTNotes: (Same            



   As: Lipitor)            



               



               

 

 Dextrose 50%  12.5 gm, 25 mL,    No Longer      



 Syringe  Route: IVP, Drug    Active        [facility]



   Form: INJ,            



   Dosing Weight            



   95.909, kg, PRN,            



   PRN Blood            



   Glucose Results,            



   Start date:            



   18            



   15:30:00 CDT,            



   Duration: 30            



   day, Stop date:            



   18            



   15:29:00 CDT            



               



               

 

 Glucagon  1 mg, Route: IM,    No Longer      



   Drug form:    Active        [facility]



   PDR/INJ, PRN,            



   Dosing Weight            



   95.909, kg, PRN            



   Blood Glucose            



   Results, Start            



   date: 18            



   15:30:00 CDT,            



   Duration: 30            



   day, Stop date:            



   18            



   15:29:00 CDT            



               



               

 

 Insulin Lispro  2 unit, 0.02 mL,    No Longer      



   Route: SUB-Q,    Active        [facility]



   Drug form: SOLN,            



   TID-Before            



   Meals, Dosing            



   Weight 95.909,            



   kg, PRN Blood            



   Glucose Results,            



   Start date:            



   18            



   15:30:00 CDT,            



   Duration: 30            



   day, Stop date:            



   18            



   15:29:00            



   CDTNotes: (Same            



   as: Humalog )            



   Roll in palms of            



   hands gently;            



   Do not shake            



   `vigorously.            



   "Single Patient            



   Use Only "            



   WASTE: F/P -            



   Black; E -            



   Municipal Trash            



   Bin  Stable for            



   28 days at room            



   temperature.            



   Expires in _____            



   days from            



   ______________Da            



   te            



               

 

 Lovenox  40 mg, 0.4 mL,    No Longer      



   Route: SUB-Q,    Active        [facility]



   Drug form: INJ,            



   [id number], Dosing            



   Weight 95.909,            



   kg, Start date:            



   18            



   11:00:00 CDT,            



   Duration: 30            



   day, Stop date:            



   18            



   11:00:00            



   CDTNotes: (Same            



   as: Lovenox)            



               



               

 

 Imdur  30 mg, 1 tab,    No Longer      



   Route: PO, Drug    Active        [facility]



   form: ERTAB,            



   QAM, Dosing            



   Weight 95.909,            



   kg, Start date:            



   18            



   10:30:00 CDT,            



   Duration: 30            



   day, Stop date:            



   18 9:00:00            



   CDTNotes: (Same            



   as:Imdur)            



               

 

 Protonix  40 mg, 1 tab,    No Longer      



   Route: PO, Drug    Active        [facility]



   form: ECTAB,            



   Daily, Dosing            



   Weight 95.909,            



   kg, Start date:            



   18 9:00:00            



   CDT, Duration:            



   30 day, Stop            



   date: 18            



   9:00:00            



   CDTNotes: Tablet            



   should not be            



   chewed or            



   crushed. (Same            



   as: Protonix)            



               



               

 

 Dilaudid  2 mg, 1 tab,    No Longer      



   Route: PO, Drug    Active        [facility]



   form: TAB, Q6H,            



   Dosing Weight            



   95.909, kg, PRN            



   Pain Score 4-6,            



   Start date:            



   18            



   16:16:00 CDT,            



   Duration: 30            



   day, Stop date:            



   18            



   16:15:00            



   CDTNotes: (Same            



   as: Dilaudid)            



               



               

 

 Insulin Lispro  4 unit, 0.04 mL,    No Longer      



   Route: SUB-Q,    Active        [facility]



   Drug form: SOLN,            



   Bedtime, Dosing            



   Weight 95.909,            



   kg, PRN Blood            



   Glucose Results,            



   Start date:            



   18            



   13:48:00 CDT,            



   Duration: 30            



   day, Stop date:            



   18            



   13:47:00            



   CDTNotes: (Same            



   as: Humalog )            



   Roll in palms of            



   hands gently;            



   Do not shake            



   `vigorously.            



   "Single Patient            



   Use Only "            



   WASTE: F/P -            



   Black; E -            



   Municipal Trash            



   Bin  Stable for            



   28 days at room            



   temperature.            



   Expires in _____            



   days from            



   ______________Da            



   te            



               

 

 Dextrose 50%  12.5 gm, 25 mL,    No Longer      



 Syringe  Route: IVP, Drug    Active        [facility]



   Form: INJ,            



   Dosing Weight            



   95.909, kg, PRN,            



   PRN Blood            



   Glucose Results,            



   Start date:            



   18            



   13:48:00 CDT,            



   Duration: 30            



   day, Stop date:            



   18            



   13:47:00 CDT            



               



               

 

 Glucagon  1 mg, Route: IM,    No Longer      



   Drug form:    Active        [facility]



   PDR/INJ, PRN,            



   Dosing Weight            



   95.909, kg, PRN            



   Blood Glucose            



   Results, Start            



   date: 18            



   13:48:00 CDT,            



   Duration: 30            



   day, Stop date:            



   18            



   13:47:00 CDT            



               



               

 

 Ketorolac  30 mg, 1 mL,    No Longer      



   Route: IV, Drug    Active        [facility]



   form: INJ, Q6H,            



   Dosing Weight            



   95.909, kg, PRN            



   Pain Score 6-10,            



   Start date:            



   18            



   10:41:00 CDT,            



   Duration: 4 day,            



   Stop date:            



   18            



   10:40:00            



   CDTNotes: (Same            



   as:Toradol) IV            



   bolus must be            



   given >15            



   seconds. Give IM            



   administration            



   slowly and            



   deeply into the            



   muscle.  Not for            



   use > 4 days            



   *** MEDICATION            



   WASTE ***            



   Product Size:            



   30 mg Product            



   Wasted:  ___ mg            



               



               

 

 Visipaque  75 mL, Route:    Inactive      



   IV, Drug form:            [facility]



   SOLN, ONCE,            



   Dosing Weight            



   95.909, kg,            



   Start date:            



   18            



   10:37:00 CDT,            



   Stop date:            



   18            



   10:37:00            



   CDTNotes: (Same            



   as: Visipaque).            



   WASTE: F/P -            



   Black; E -            



   Municipal Trash            



   Bin            



               

 

 Acetaminophen  1 tab, Route:    Inactive      



 300 MG /  PO, Drug Form:            [facility]



 Codeine  TAB, Dosing            



 Phosphate 30 MG  Weight 95.909,            



 Oral Tablet  kg, Q4H, PRN            



 [Tylenol with  Pain Score 1-3,            



 Codeine #3]  Start date:            



   18            



   10:12:00 CDT,            



   Duration: 30            



   day, Stop date:            



   18            



   10:11:00 CDT            



               



               

 

 aspirin 325 mg  325 mg, 1 tab,    No Longer      



 tablet  Route: PO, Drug    Active        [facility]



   form: ECTAB,            



   Daily, Dosing            



   Weight 96.004,            



   kg, Start date:            



   18 9:00:00            



   CDT, Duration:            



   30 day, Stop            



   date: 18            



   9:00:00            



   CDTNotes: (Do            



   Not Crush)  Do            



   not crush or            



   chew.            



               

 

 metoprolol  25 mg, 1 tab,    No Longer      



 tartrate  Route: PO, Drug    Active        [facility]



   form: TAB, Q12H,            



   Dosing Weight            



   96.004, kg,            



   Start date:            



   18 9:00:00            



   CDT, Duration:            



   30 day, Stop            



   date: 18            



   21:00:00            



   CDTNotes: (Same            



   as: Lopressor)            



               



               

 

 Sodium Chloride  750 mL, Rate: 75    Inactive        



 0.9%  mL  ml/hr, Infuse            [facility]



   over: 10 hr,            



   Route: IV,            



   Dosing Weight            



   95.909 kg, Total            



   Volume: 750,            



   Start date:            



   18 8:51:00            



   CDT, Duration:            



   10 hr, Stop            



   date: 18            



   18:50:00 CDT,            



   2.14, m2            



               

 

 Acetaminophen  650 mg, 2 tab,    No Longer      



   Route: PO, Drug    Active        [facility]



   form: TAB, Q4H,            



   Dosing Weight            



   95.909, kg, PRN            



   Pain Score 7-10,            



   Start date:            



   18 8:51:00            



   CDT, Duration:            



   30 day, Stop            



   date: 18            



   8:50:00            



   CDTNotes: Do not            



   exceed 4 gm/day.            



    (Same as:            



   Tylenol)            



               

 

 Sodium Chloride  1,000 mL, Infuse    Inactive        



 0.9% (Bolus) IV  Over: 1 hr,          [address] [place] [address]



   Route: IV, ONCE,            



   Priority: STAT,            



   Dosing Weight            



   96.004 kg, Start            



   date: 18            



   6:52:00 CDT,            



   Stop date:            



   18 6:52:00            



   CDT            



               

 

 Metoprolol  5 mg, Route:    Inactive      



   IVP, Drug form:          2018  [place]



   INJ, ONCE,            



   Dosing Weight            



   96.004, kg,            



   Priority: STAT,            



   Start date:            



   18 6:48:00            



   CDT, Stop date:            



   18 6:48:00            



   CDT            



               

 

 Nitroglycerin  0.4 mg, 1 tab,    Inactive        



 0.4 MG  Route: SL, Drug            [place]



 Sublingual  form: TAB,            



 Tablet  Q5Min, Dosing            



   Weight 96.004,            



   kg, PRN Chest            



   Pain, Priority:            



   STAT, Start            



   date: 18            



   6:47:00 CDT,            



   Duration: 3            



   doses or times,            



   Stop date:            



   Limited # of            



   timesNotes:            



   (Same            



   as:Nitroquick,            



   Nitrostat) "Do            



   Not Crush"            



   Sublingual            



   tablet            



               

 

 Saline Flush  10 mL, Route:    Inactive      



 0.9%  IVP, Drug Form:            [place]



   INJ, Dosing            



   Weight 96.004,            



   kg, PRN, PRN            



   Line Flush,            



   Start date:            



   18 6:33:00            



   CDT, Duration:            



   30 day, Stop            



   date: 18            



   6:32:00            



   CDTNotes: (Same            



   as: BD            



   Posiflush)            



               

 

 Nystatin 100  1 appl, TOP,    Active        



 UNT/MG Topical  TID, X 7 day, #          2017  [place]



 Ointment  15 gm, 0            



   Refill(s)            



               

 

 Cephalexin 500  500 mg=1 cap,    Active        



 MG Oral Capsule  PO, QID, X 7          2017  [facility]



 [Keflex]  day, # 28 cap, 0            



   Refill(s)            



               

 

 Glipizide 2.5  1 tab, PO, BID,    Active        



 MG / Metformin  # 60 tab, 0          2017  [place]



 hydrochloride  Refill(s)            



 500 MG Oral              



 Tablet              



               

 

 Insulin regular  8 unit, 0.08 mL,    Inactive      



   Route: IVP, Drug            [place]



   form: INJ, ONCE,            



   Dosing Weight            



   109.091, kg,            



   Priority: STAT,            



   Start date:            



   17 3:29:00            



   CST, Stop date:            



   17 3:29:00            



   CSTNotes: (Same            



   as: Humulin R            



   and NovoLIN R)            



   WASTE: F/P -            



   Black; E -            



   Municipal Trash            



   Bin  (Do not            



   shake)            



               

 

 Insulin regular  10 unit, 0.1 mL,    Inactive      



   Route: IVP, Drug          2017  [place]



   form: INJ, ONCE,            



   Dosing Weight            



   109.091, kg,            



   Priority: STAT,            



   Start date:            



   17 2:39:00            



   CST, Stop date:            



   17 2:39:00            



   CSTNotes: (Same            



   as: Humulin R            



   and NovoLIN R)            



   WASTE: F/P -            



   Black; E -            



   Municipal Trash            



   Bin  (Do not            



   shake)            



               

 

 Sodium Chloride  1,000 mL, 1000    Inactive      



 0.154 MEQ/ML  ml/hr, Infuse            [place]



 Injectable  Over: 1 hr,            



 Solution  Route: IV,            



   1,000, Drug            



   form: INJ, ONCE,            



   Priority: STAT,            



   Dosing Weight            



   109.091 kg,            



   Start date:            



   17 2:39:00            



   CST, Duration: 1            



   doses or times,            



   Stop date:            



   17 2:39:00            



   CST            



               

 

 Dilaudid  1 mg, Route: IV,    Inactive        



   ONCE, Dosing            [facility]



   Weight 109.091,            



   kg, Start date:            



   14            



   19:20:00, Stop            



   date: 14            



   19:20:00            



               

 

 Dilaudid  0.5 mg, Route:    Inactive        



   IV, ONCE, Dosing            [facility]



   Weight 109.091,            



   kg, Start date:            



   14            



   18:17:00, Stop            



   date: 14            



   18:17:00            



               

 

 Dilaudid  0.5 mg, Route:    Inactive        



   IV, ONCE, Dosing            [facility]



   Weight 109.091,            



   kg, Start date:            



   14            



   15:52:00, Stop            



   date: 14            



   15:52:00            



               

 

 Dilaudid  1 mg, Route: IV,    Inactive        



   ONCE, Dosing            [facility]



   Weight 109.091,            



   kg, Start date:            



   14            



   14:42:00, Stop            



   date: 14            



   14:42:00            



               

 

 normal saline  1,000 mL, Rate:    Inactive      



 0.9% IV 1000 mL  1,000 ml/hr,            [facility]



   Infuse over: 1            



   hr, Route: IV,            



   Dosing Weight            



   109.091 kg,            



   Total Volume:            



   1,000, Priority:            



   STAT, Start            



   date: 14            



   14:41:00,            



   Duration: 1            



   doses or times,            



   Stop date:            



   14            



   15:40:00            



               

 

 Zofran  4 mg, Route:    Inactive        



   IVP, Drug form:            [facility]



   INJ, ONCE,            



   Dosing Weight            



   109.091, kg,            



   Priority: STAT,            



   Start date:            



   14            



   14:40:00, Stop            



   date: 14            



   14:40:00            



               

 

 indomethacin 50  50 mg=1 cap, PO,    Active        



 mg oral capsule  Q8H, # 30 cap, 0          2014  [facility]



   Refill(s)            



               



               

 

 Ketorolac  30 mg, Route:    Inactive        



   IVP, Drug form:            [facility]



   INJ, ONCE,            



   Dosing Weight            



   109.091, kg,            



   Priority: STAT,            



   Start date:            



   14            



   1:27:00, Stop            



   date: 14            



   1:27:00            



               

 

 aspirin 325 mg  325 mg, Route:    Inactive        



 tablet  PO, Drug form:            [facility]



   TAB, ONCE,            



   Dosing Weight            



   109.091, kg,            



   Priority: STAT,            



   Start date:            



   14            



   1:27:00, Stop            



   date: 14            



   1:27:00            



               

 

 Saline Flush  5 ml, Route:    Inactive        



 0.9%  IVP, Drug Form:            [facility]



   INJ, Dosing            



   Weight 109.091,            



   kg, PRN, PRN            



   Line Flush,            



   Start date:            



   14            



   1:27:00,            



   Duration: 30            



   day, Stop date:            



   14            



   1:26:00Notes:            



   (Same as: BD            



   Posiflush)            



               

 

 Glipizide 10 MG  10 mg=1 tab, PO,    Active        



 Oral Tablet  Daily, # 30 tab,          2014  [facility]



   0 Refill(s)            



               



               

 

 Metformin  500 mg=1 tab,    Active        



 hydrochloride  PO, BID, # 30            [facility]



 500 MG Oral  tab, 0 Refill(s)            



 Tablet              



               

 

 Saline Flush  5 mL, Route:    Inactive        



 0.9%  IVP, Drug Form:          2014  [facility]



   INJ, Dosing            



   Weight 109.091,            



   kg, Q8H, PRN            



   Line Flush,            



   Start date:            



   14            



   0:49:00,            



   Duration: 30            



   day, Stop date:            



   14            



   0:48:00,            



   Administer at            



   least once every            



   8 hoursSpecial            



   Instructions:            



   Administer at            



   least once every            



   8 hoursNotes:            



   Same as: BD            



   Posiflush            



   Ster[facility]            



               

 

 Aspirin /  324 mg, Route:    Inactive      



 Calcium  PO, ONCE, Dosing            [facility]



 Carbonate  Weight 109.091,            



   kg, Priority:            



   STAT, Start            



   date: 14            



   0:49:00, Stop            



   date: 14            



   0:49:00            



               

 

 metFORmin 500  500 mg, 1 tab,  PO  No Longer    [facility]    



 mg oral tablet,  Route: PO, Drug    Active        [facility]



 extended  form: ERTAB,            



 release  Daily, Start            



   date: 11            



   4:16:00,            



   Duration: 30            



   day, Stop date:            



   12/15/11 9:00:00            



               



               

 

 Insulin regular  6 unit, Route:  IV  No Longer    [facility]    



   IV, ONCE, Start    Active        [facility]



   date: 11            



   4:00:00, Stop            



   date: 11            



   4:00:00            



               

 

 Sodium Chloride  1,000 mL, Rate:  IV  No Longer    [facility]    



 0.9% (Bolus) IV  1,000 ml/hr,    Active        [facility]



 1000 mL  Infuse over: 1            



   hr, Route: IV,            



   Total Volume:            



   1,000, Bolus            



   Dose, Priority:            



   STAT, Start            



   date: 11            



   2:51:00,            



   Duration: 1            



   doses or times,            



   Stop date:            



   11 3:50:00            



               



               

 

 metFORmin 500  500 mg, 1 tab,  PO  Active    [facility]    



 mg oral tablet  PO, BID, 60 tab,          2011  [facility]



   Substitution            



   Allowed            



               

 

 Insulin regular  10 unit, Route:  IVP  No Longer    [facility]    



   IVP, ONCE,    Active        [facility]



   Priority: STAT,            



   Start date:            



   11            



   0:49:00, Stop            



   date: 11            



   0:49:00            



               

 

 Insulin regular  10 unit, Route:  SUB-Q  No Longer    [facility]    



   SUB-Q, ONCE,    Active        [facility]



   Priority: STAT,            



   Start date:            



   11/15/11            



   23:25:00, Stop            



   date: 11/15/11            



   23:25:00            



               

 

 Sodium Chloride  1,000 mL, Rate:  IV  No Longer    [facility]    



 0.9% (Bolus) IV  1,000 ml/hr,    Active        [facility]



 1000 mL  Infuse over: 1            



   hr, Route: IV,            



   Total Volume:            



   1,000, Bolus            



   Dose, Priority:            



   STAT, Start            



   date: 11/15/11            



   23:24:00,            



   Duration: 1            



   doses or times,            



   Stop date:            



   11 0:23:00            



               



               







Allergies, Adverse Reactions, Alerts







 Substance  Category  Reaction  Severity  Reaction  Status  Date  Comments  
Source



         type    Reported    



                 



                 



                 

 

 morphine  Assertion      Drug  Active      [facility]



         allergy        [facility]



                 



                 



                 

 

 Vicodin  Assertion      Drug  Active      [facility]



         allergy        [facility]



                 



                 



                 

 

 Norco  Assertion      Drug  Active      [facility]



         allergy        [facility]



                 



                 



                 







Immunizations







 Immunization  Date Given  Site  Status  Last Updated  Comments  Source



             



             







Results







 Order Name  Results  Value  Reference  Date  Interpretation  Comments  Source



       [facility]        



               



               



               

 

 Barium  Barium  Barium Swallow w Esophagus Function DX        -  



 Swallow w  Swallow     -  [facility]



 Esophagus  Esophagus            



 Function  Function DX            



 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  
      Read by:  Serafin Duval MD  



             Dictated Date/time:  18 13:06  



             Electronically Signed by:  Serafin Duval MD                 
   18 13:19  



             FINAL REPORT  



     COMPARISON: CT chest of 2018.          



               



               



               



     TECHNIQUE: Thin barium was utilized for prominent LPO images. Thick barium 
was utilized for upright imaging of the esophagus and pharynx. Hamburger meat 
with paste was given to evaluate motility with solids.          



               



               



               



     FINDINGS: There is no delay in passage of bolus from the pharynx into the 
esophagus. The cricopharyngeus relaxes normally. There is no evidence for 
cricopharyngeal bar or Zenker's diverticulum.          



               



               



               



     Prone and LPO images of the esophagus reveal no mass lesion or stricture. 
         



               



               



               



     Upright images of the esophagus reveals mild esophageal dysmotility. No 
mass lesions or strictures are visualized.          



               



               



               



     Subsequently 2 separate swallows of a small piece of hamburger meat and 
barium barium paste were fluoroscopically followed through the length of the 
esophagus. There is no significant delay in passage o          



     f the solid barium bolus from the pharynx into the esophagus and 
subsequently into the stomach.          



               



               



               



     IMPRESSION:          



               



               



               



     Mild esophageal dysmotility. Otherwise, negative video esophagram.        
  



               



               



               



               



               



               



               



     Fluoroscopy Time: About 2.8 minute.          



               



               



               



               



               



               



               



               



               



               



               



               



               



     SL: [id number]          



               



               



               



               

 

 CARDIAC  Troponin-I  11.00  0.00 -      Result  



 ENZYMES    ng/mL  0.40  2018    Comment:  [facility]



             Critical  



             Result(s)  



             called to  



             Alexis Ennis  



             at 2018  



             05:53 by BE.  



             Read back OK.  



               



               

 

 ELECTROLYT  AGAP  13.8 meq/L  10.0 -        



 ES      20.0  /      [facility]



               



               



               



               

 

 ELECTROLYT  Potassium  3.8 meq/L  3.5 - 5.1        



 ES  Lvl      /      [facility]



               



               



               



               

 

 ELECTROLYT  Chloride Lvl  103 meq/L  95 - 109        



 ES              [facility]



               



               



               



               

 

 ELECTROLYT  eGFR  128        Result Comment: The eGFR is calculated using 
the CKD-EPI formula. In most young, healthy individuals the eGFR will be >90 mL/
min/1.73m2. The eGFR declines with age. An eGFR of 60-89 may be normal in  



 ES    mL/min/1.7    /    some populations, particularly the elderly, for 
whom the CKD-EPI formula has not been extensively validated. Use of the eGFR is 
not recommended in the following populations:  [facility]



     3m2          



             Individuals with unstable creatinine concentrations, including 
pregnant patients and those with serious co-morbid conditions.  



               



             Patients with extremes in muscle mass or diet.  



               



             The data above are obtained from the National Kidney Disease 
Education Program (NKDEP) which additionally recommends that when the eGFR is 
used in patients with extremes of body mass index for purposes  



             of drug dosing, the eGFR should be multiplied by the estimated 
BMI.  

 

 ELECTROLYT  Calcium Lvl  8.2 mg/dL  8.5 - 10.5        



 ES              [facility]



               



               



               



               

 

 ELECTROLYT  Sodium Lvl  134 meq/L  135 - 145        



 ES              [facility]



               



               



               



               

 

 ELECTROLYT  CO2  21 meq/L  24 - 32        



 ES              [facility]



               



               



               



               

 

 ELECTROLYT  BUN  12 mg/dL  7 - 22        



 ES              [facility]



               



               



               



               

 

 ELECTROLYT  Creatinine  0.56 mg/dL  0.50 -        



 ES  Lvl    1.40  /      [facility]



               



               



               



               

 

 ELECTROLYT  Glucose Lvl  240 mg/dL  70 - 99  /      



 ES              [facility]



               



               



               



               

 

 HEMATOLOGY  Lymphocytes  21.3 %  20.0 -  04/      [facility]



       40.0  /      [facility]



               



               



               



               

 

 HEMATOLOGY  Monocytes  8.2 %  2.0 - 12.0  /      [facility]



               



               



               



               

 

 HEMATOLOGY  Segs  68.3 %  45.0 -  04/      



       75.0  /2018      [facility]



               



               



               



               

 

 HEMATOLOGY  Basophils #  0.1 K/CMM  0.0 - 0.2  /      [facility]



         /      [facility]



               



               



               



               

 

 HEMATOLOGY  Eosinophils  0.2 K/CMM  0.0 - 0.5  /      



   #      /2018      [facility]



               



               



               



               

 

 HEMATOLOGY  Monocytes #  0.8 K/CMM  0.0 - 0.8  /      [facility]



         /      [facility]



               



               



               



               

 

 HEMATOLOGY  Segs-Bands #  7.0 K/CMM  1.5 - 8.1  



         /      [facility]



               



               



               



               

 

 HEMATOLOGY  Lymphocytes  2.2 K/CMM  1.0 - 5.5  /      [facility]



   #      /2018      [facility]



               



               



               



               

 

 HEMATOLOGY  Eosinophils  1.7 %  0.0 - 4.0  /



         /      [facility]



               



               



               



               

 

 HEMATOLOGY  Basophils  0.5 %  0.0 - 1.0        [facility]



               



               



               



               

 

 HEMATOLOGY  MPV  8.0 fL  7.4 - 10.4        [facility]  Platelet  220 K/CMM  133 - 450  



         /      [facility]



               



               



               



               

 

 HEMATOLOGY  RBC  5.24 M/CMM  4.70 -  /      



       6.10  /      [facility]  Hgb  15.2 g/dL  14.0 -  /      



       18.0  /      [facility]  WBC  10.2 K/CMM  3.7 - 10.4        [facility]  MCV  82.3 fL  80.0 -        



       94.0  /      [facility]



               



               



               



               

 

 HEMATOLOGY  Hct  43.1 %  42.0 -        



       54.0  /      [facility]  MCHC  35.4 g/dL  32.0 -        



       36.0  /      [facility]  MCH  29.1 pg  27.0 -        



       31.0  /      [facility]



               



               



               



               

 

 HEMATOLOGY  RDW  13.5 %  11.5 -  /      



       14.5  /      [facility]  PT  13.3 s  12.0 -  /      



       14.7  /      [facility]



               



               



               



               

 

 HEMATOLOGY  INR  1.01  0.85 -        



       1.17  /      [facility]



               



               



               



               

 

 HEMATOLOGY  PTT  28.8 s  22.9 -  /      



       35.8  /2018      [facility]



               



               



               



               

 

 CARDIAC  Troponin-I  1.90 ng/mL  0.00 -  04    Result  



 ENZYMES      0.40  /2018    Comment:  [facility]



             Critical  



             Result(s)  



             called to  



             Linda Betancourt at  



             2018  



             16:38 by MS.  



             Read back OK.  



               



               

 

 CARDIAC  Troponin-I  0.08 ng/mL  0.00 -  04      



 ENZYMES      0.40  /2018      [facility]



               



               



               



               

 

 CHEM PANEL  eGFR  120        Result Comment: The eGFR is calculated using 
the CKD-EPI formula. In most young, healthy individuals the eGFR will be >90 mL/
min/1.73m2. The eGFR declines with age. An eGFR of 60-89 may be normal in  



     mL/min/1.7        some populations, particularly the elderly, for 
whom the CKD-EPI formula has not been extensively validated. Use of the eGFR is 
not recommended in the following populations:  [facility]



     3m2          



             Individuals with unstable creatinine concentrations, including 
pregnant patients and those with serious co-morbid conditions.  



               



             Patients with extremes in muscle mass or diet.  



               



             The data above are obtained from the National Kidney Disease 
Education Program (NKDEP) which additionally recommends that when the eGFR is 
used in patients with extremes of body mass index for purposes  



             of drug dosing, the eGFR should be multiplied by the estimated 
BMI.  

 

 CHEM PANEL  Creatinine  0.65 mg/dL  0.50 -        



   Lvl    1.40        [facility]



               



               



               



               

 

 HEMATOLOGY  Hgb  15.0 g/dL  14.0 -        



       18.0        [facility]



               



               



               



               

 

 Chest w/wo  Chest w/wo  Clinical Indication: Chest pain - possible pericardial 
cyst or large hiatal hernia        -  



 contrast  contrast CT      /    -  [facility]



 CT              



               



     Comparison: None        Read by:  Preet Herron MD  



             Dictated Date/time:  18 14:42  



             Electronically Signed by:  Preet Herron MD                        
   18 14:52  



             FINAL REPORT  



     TECHNIQUE: Sequential trans-axial images were obtained thru the chest and 
upper abdomen before and after administration of iodinated contrast. Coronal 
and sagittal reconstructions were obtained. 75 cc of Visipaque was used for the 
exam.          



               



               



               



     Dose:  CXX=768 mGy-cm          



               



               



               



     FINDINGS:          



               



     LUNG PARENCHYMA AND PLEURA: There are no lung nodules.  There is no 
significant interstitial lung disease.  There are no pleural effusions.   There 
is no pneumothorax.          



               



               



               



     AIRWAY: The central airway is normal.          



               



     .          



               



               



               



     MEDIASTINUM: There is no mediastinal lymphadenopathy.   There is fatty 
mass in the anterior mediastinum slightly to the right which is compressing the 
right heart border and measures 10.9 x 6.6 x 10.2 c          



     m in size. There is a peripherally calcified lesion within the fat which 
may represent prior fat necrosis. There is a defect in the anterior aspect of 
the diaphragm.          



               



               



               



     HEART: There is no evidence of RV strain.  The cardiac chambers are 
otherwise unremarkable.  There is no pericardial effusion.          



               



               



               



     VASCULAR STRUCTURES:  The pulmonary arteries and great vessels are 
unremarkable. The thoracic aorta is within normal limits.. The superior vena 
cava is unremarkable.          



               



               



               



     OSSEOUS STRUCTURES: There are no significant osseous abnormalities seen.  
        



               



               



               



               



               



     VISUALIZED UPPER ABDOMEN: The visualized upper abdomen is within normal 
limits.          



               



               



               



     IMPRESSION:          



               



     1.  Herniation of fat into the anterior mediastinum through an anterior 
diaphragmatic defect, this is consistent with a Morgagni hernia.          



               



               



               



               



               



     SL:  [id number]          



               



               



               



               

 

 CARDIAC  CK MB Index  1.3  0.0 - 2.5  /      



 ENZYMES        /      [place]



               



               



               



               

 

 CARDIAC  CK MB  2.8 ng/mL  0.5 - 3.6  /      



 ENZYMES        /      [place]



               



               



               



               

 

 CARDIAC  Troponin-I  null  0.00 -  04/      [facility]



 ENZYMES      0.40  /2018      [place]



               



               



               



               

 

 CARDIAC  Total CK  220 unit/L  12 - 191        



 ENZYMES        /      [place]



               



               



               



               

 

 CHEM PANEL  eGFR  110        Result Comment: The eGFR is calculated using 
the CKD-EPI formula. In most young, healthy individuals the eGFR will be >90 mL/
min/1.73m2. The eGFR declines with age. An eGFR of 60-89 may be normal in  



     mL/min/1.7        some populations, particularly the elderly, for 
whom the CKD-EPI formula has not been extensively validated. Use of the eGFR is 
not recommended in the following populations:  [address]          



             Individuals with unstable creatinine concentrations, including 
pregnant patients and those with serious co-morbid conditions.  



               



             Patients with extremes in muscle mass or diet.  



               



             The data above are obtained from the National Kidney Disease 
Education Program (NKDEP) which additionally recommends that when the eGFR is 
used in patients with extremes of body mass index for purposes  



             of drug dosing, the eGFR should be multiplied by the estimated 
BMI.  

 

 CHEM PANEL  Potassium  3.8 meq/L  3.5 - 5.1        [facility]



   Lvl            [place]



               



               



               



               

 

 CHEM PANEL  Sodium Lvl  132 meq/L  135 - 145  /      [facility]



               [place]



               



               



               



               

 

 CHEM PANEL  Globulin  4.1 g/dL  2.7 - 4.2  /      [facility]



               [place]



               



               



               



               

 

 CHEM PANEL  CO2  29 meq/L  24 - 32  /      [place]



               



               



               



               

 

 CHEM PANEL  Chloride Lvl  99 meq/L  95 - 109  /      [place]



               



               



               



               

 

 CHEM PANEL  AGAP  7.8 meq/L  10.0 -  04      [facility]



       20.0        [place]



               



               



               



               

 

 CHEM PANEL  Total  8.2 g/dL  6.4 - 8.4  /      [facility]



   Protein            [place]



               



               



               



               

 

 CHEM PANEL  Albumin Lvl  4.1 g/dL  3.5 - 5.0        [place]



               



               



               



               

 

 CHEM PANEL  B/C Ratio  20  6 - 25        [place]



               



               



               



               

 

 CHEM PANEL  Calcium Lvl  8.5 mg/dL  8.5 - 10.5        [place]



               



               



               



               

 

 CHEM PANEL  ALT  65 unit/L  0 - 65        [place]



               



               



               



               

 

 CHEM PANEL  AST  37 unit/L  0 - 37        [place]



               



               



               



               

 

 CHEM PANEL  A/G Ratio  1.0  0.7 - 1.6        [place]



               



               



               



               

 

 CHEM PANEL  Bili Total  1.0 mg/dL  0.2 - 1.3        [place]



               



               



               



               

 

 CHEM PANEL  Alk Phos  137 unit/L  39 - 136        [place]



               



               



               



               

 

 CHEM PANEL  BUN  16 mg/dL  7 - 22        [place]



               



               



               



               

 

 CHEM PANEL  Creatinine  0.81 mg/dL  0.50 -  04      [facility]



   Lvl    1.40        [place]



               



               



               



               

 

 CHEM PANEL  Glucose Lvl  268 mg/dL  70 - 99        [place]



               



               



               



               

 

 HEMATOLOGY  Segs-Bands #  4.1 K/CMM  1.5 - 8.1        [place]



               



               



               



               

 

 HEMATOLOGY  Basophils  1.0 %  0.0 - 1.0        [place]



               



               



               



               

 

 HEMATOLOGY  Eosinophils  2.4 %  0.0 - 4.0        [place]



               



               



               



               

 

 HEMATOLOGY  Lymphocytes  2.5 K/CMM  1.0 - 5.5        [place]



               



               



               



               

 

 HEMATOLOGY  Segs  54.8 %  45.0 -  



       75.0        [place]



               



               



               



               

 

 HEMATOLOGY  Lymphocytes  33.4 %  20.0 -  04      [facility]



       40.0        [place]



               



               



               



               

 

 HEMATOLOGY  Monocytes  8.4 %  2.0 - 12.0        [place]



               



               



               



               

 

 HEMATOLOGY  Monocytes #  0.6 K/CMM  0.0 - 0.8        [place]



               



               



               



               

 

 HEMATOLOGY  Basophils #  0.1 K/CMM  0.0 - 0.2        [place]



               



               



               



               

 

 HEMATOLOGY  Eosinophils  0.2 K/CMM  0.0 - 0.5        [place]



               



               



               



               

 

 HEMATOLOGY  MPV  7.9 fL  7.4 - 10.4        [place]



               



               



               



               

 

 HEMATOLOGY  RDW  13.8 %  11.5 -        [facility]



       14.5        [place]



               



               



               



               

 

 HEMATOLOGY  Platelet  265 K/CMM  133 - 450        [facility]



         /      [place]



               



               



               



               

 

 HEMATOLOGY  WBC  7.4 K/CMM  3.7 - 10.4        [place]



               



               



               



               

 

 HEMATOLOGY  MCHC  36.5 g/dL  32.0 -        [facility]



       36.0        [place]



               



               



               



               

 

 HEMATOLOGY  MCH  29.9 pg  27.0 -        [facility]



       31.0        [place]



               



               



               



               

 

 HEMATOLOGY  MCV  81.9 fL  80.0 -        [facility]



       94.0        [place]



               



               



               



               

 

 HEMATOLOGY  Hgb  17.2 g/dL  14.0 -        [facility]



       18.0        [place]



               



               



               



               

 

 HEMATOLOGY  Hct  47.1 %  42.0 -        [facility]



       54.0        [place]



               



               



               



               

 

 HEMATOLOGY  RBC  5.75 M/CMM  4.70 -        [facility]



       6.10        [place]



               



               



               



               

 

 Chest  Chest 1view  EXAM: Chest x-ray one view (frontal)        -  
[facility]



 1view DX  DX      /    -  [place]



               



               



     HISTORY: - stemi.        Read by:  Андрей Aponte MD  



             Dictated Date/time:  18 06:56  



             Electronically Signed by:  Андрей Aponte MD                       
   18 07:00  



             FINAL REPORT  



     COMPARISON: 2014          



               



               



               



     FINDINGS:          



               



               



               



               



               



               



               



     Mediastinum: The heart shadow is severely enlarged, similar to the 
comparison study given differences in technique.          



               



               



               



     Lungs and pleura:     No focal consolidation, pleural effusion or 
pneumothorax. The pulmonary vascularity is normal.          



               



               



               



               



               



               



               



     No acute osseous displaced abnormality is noted.          



               



               



               



     IMPRESSION:          



               



               



               



     The cardiac shadow is severely enlarged, similar to the prior comparison 
study dated 2014 and may represent cardiomegaly. Further imaging can be 
obtained if concern for pericardial effusion.          



               



               



               



     The pulmonary vascularity is normal.          



               



               



               



               



               



               



               



      SL: [id number]          



               



               



               



               

 

 CHEM PANEL  Bili Total  0.7 mg/dL  0.2 - 1.3  



         /      [place]



               



               



               



               

 

 CHEM PANEL  ASPARTATE  16 unit/L  0 - 37        



   TRANSAMINASE            [place]



               



               



               



               

 

 CHEM PANEL  Total  7.4 g/dL  6.4 - 8.4  02/02      [facility]



   Protein      /      [place]



               



               



               



               

 

 CHEM PANEL  Alk Phos  147 unit/L  39 - 136        [place]



               



               



               



               

 

 CHEM PANEL  Creatinine  0.84 mg/dL  0.50 -        



   Lvl    1.40  /      [place]



               



               



               



               

 

 CHEM PANEL  BUN  12 mg/dL  7 - 22        [place]



               



               



               



               

 

 CHEM PANEL  Glucose Lvl  393 mg/dL  70 - 99        [place]



               



               



               



               

 

 CHEM PANEL  eGFR  109        Result Comment: The eGFR is calculated using 
the CKD-EPI formula. In most young, healthy individuals the eGFR will be >90 mL/
min/1.73m2. The eGFR declines with age. An eGFR of 60-89 may be normal in  



     mL/min/1.7        some populations, particularly the elderly, for 
whom the CKD-EPI formula has not been extensively validated. Use of the eGFR is 
not recommended in the following populations:  [address]          



             Individuals with unstable creatinine concentrations, including 
pregnant patients and those with serious co-morbid conditions.  



               



             Patients with extremes in muscle mass or diet.  



               



             The data above are obtained from the National Kidney Disease 
Education Program (NKDEP) which additionally recommends that when the eGFR is 
used in patients with extremes of body mass index for purposes  



             of drug dosing, the eGFR should be multiplied by the estimated 
BMI.  

 

 CHEM PANEL  Chloride Lvl  100 meq/L  95 - 109        [place]



               



               



               



               

 

 CHEM PANEL  Potassium  3.8 meq/L  3.5 - 5.1        



   Lvl            [place]



               



               



               



               

 

 CHEM PANEL  Sodium Lvl  138 meq/L  135 - 145        [place]



               



               



               



               

 

 CHEM PANEL  Calcium Lvl  8.9 mg/dL  8.5 - 10.5        [place]



               



               



               



               

 

 CHEM PANEL  CO2  27 meq/L  24 - 32        [place]



               



               



               



               

 

 CHEM PANEL  Albumin Lvl  3.8 g/dL  3.5 - 5.0        [place]



               



               



               



               

 

 CHEM PANEL  ALANINE  45 unit/L  0 - 65        



   AMINOTRANS      [place]



   RASE            



               



               



               

 

 CHEM PANEL  AGAP  14.8 meq/L  10.0 -  02      [facility]



       20.0        [place]



               



               



               



               

 

 CHEM PANEL  Globulin  3.6 g/dL  2.7 - 4.2        [place]



               



               



               



               

 

 CHEM PANEL  B/C Ratio  14  6 - 25        [place]



               



               



               



               

 

 CHEM PANEL  A/G Ratio  1.1  0.7 - 1.6        [place]



               



               



               



               

 

 HEMATOLOGY  Lymphocytes  2.5 K/CMM  1.0 - 5.5        [facility]



   #            [place]



               



               



               



               

 

 HEMATOLOGY  Eosinophils  0.2 K/CMM  0.0 - 0.5        



         [place]



               



               



               



               

 

 HEMATOLOGY  Monocytes #  0.6 K/CMM  0.0 - 0.8  /      [place]



               



               



               



               

 

 HEMATOLOGY  Basophils #  0.1 K/CMM  0.0 - 0.2        [place]



               



               



               



               

 

 HEMATOLOGY  Lymphocytes  29.4 %  20.0 -  02/      [facility]



       40.0  /      [place]



               



               



               



               

 

 HEMATOLOGY  Segs  60.7 %  45.0 -  /      [facility]



       75.0        [place]



               



               



               



               

 

 HEMATOLOGY  Monocytes  7.1 %  2.0 - 12.0        [place]



               



               



               



               

 

 HEMATOLOGY  Basophils  1.0 %  0.0 - 1.0        [place]



               



               



               



               

 

 HEMATOLOGY  Eosinophils  1.8 %  0.0 - 4.0        [place]



               



               



               



               

 

 HEMATOLOGY  Segs-Bands #  5.2 K/CMM  1.5 - 8.1        [place]



               



               



               



               

 

 HEMATOLOGY  MCH  28.3 pg  27.0 -        [facility]



       31.0        [place]



               



               



               



               

 

 HEMATOLOGY  RDW  13.7 %  11.5 -        [facility]



       14.5        [place]



               



               



               



               

 

 HEMATOLOGY  Hgb  15.5 g/dL  14.0 -        [facility]



       18.0        [place]



               



               



               



               

 

 HEMATOLOGY  MCV  80.3 fL  80.0 -        



       94.0        [place]



               



               



               



               

 

 HEMATOLOGY  MCHC  35.2 g/dL  32.0 -        



       36.0        [place]



               



               



               



               

 

 HEMATOLOGY  MPV  8.3 fL  7.4 - 10.4        [place]



               



               



               



               

 

 HEMATOLOGY  Platelet  243 K/CMM  133 - 450        [place]



               



               



               



               

 

 HEMATOLOGY  RBC X 10x6  5.47 M/CMM  4.70 -        



       6.10        [place]



               



               



               



               

 

 HEMATOLOGY  Hct  43.9 %  42.0 -        



       54.0        [place]



               



               



               



               

 

 HEMATOLOGY  WBC X 10x3  8.6 K/CMM  3.7 - 10.4        [place]



               



               



               



               

 

 URINE AND  UA pH  5.0  5.0 - 8.0        



 STOOL        /2017      [place]



               



               



               



               

 

 URINE AND  UA Spec Grav  <=1.005  <=1.030        



 STOOL    <br/>*NA*<          [place]



     br/>(          



     7 2:55 AM)          



               



               



               

 

 URINE AND  UA Color  Yellow  Yellow        



 STOOL        /2017      [place]



     *NA*          



               



     (17 2:55 AM)          



               

 

 URINE AND  UA Turbidity  Clear  Clear        



 STOOL              [place]



     (17 2:55 AM)          



               



               



               

 

 URINE AND  UA Mucus  None Seen  None Seen        



 STOOL              [place]



     (17 2:55 AM)          



               



               



               

 

 URINE AND  UA Bacteria  None Seen  None Seen        



 STOOL              [place]



     (17 2:55 AM)          



               



               



               

 

 URINE AND  UA Bud Yeast  Few /HPF  None Seen        



 STOOL      /HPF        [place]



               



               



               



               

 

 URINE AND  UA RBC  None Seen  0 - 2        



 STOOL              [place]



     (17 2:55 AM)          



               



               



               

 

 URINE AND  UA Bili  Negative  Negative        



 STOOL              [place]



     *NA*          



               



     (17 2:55 AM)          



               

 

 URINE AND  UA Blood  Negative  Negative        



 STOOL              [place]



     (17 2:55 AM)          



               



               



               

 

 URINE AND  UA Glucose  500 mg/dL  Negative        



 STOOL      mg/dL        [place]



               



               



               



               

 

 URINE AND  UA Protein  Negative  Negative        



 STOOL              [place]



     (17 2:55 AM)          



               



               



               

 

 URINE AND  UA Ketones  Negative  Negative        



 STOOL              [place]



     *NA*          



               



     (17 2:55 AM)          



               

 

 URINE AND  UA WBC  0-2 /HPF  None Seen        



 STOOL      /HPF        [place]



               



               



               



               

 

 URINE AND  UA Nitrite  Negative  Negative        



 STOOL              [place]



     (17 2:55 AM)          



               



               



               

 

 URINE AND  UA  0.2 EU/dL  0.1 - 1.0        



 STOOL  Urobilinogen            [place]



               



               



               



               

 

 URINE AND  UA Leuk Est  Negative  Negative        



 STOOL              [place]



     (17 2:55 AM)          



               



               



               

 

 URINE AND  UA Sq Epi  None Seen  Few        



 STOOL              [place]



     (17 2:55 AM)          



               



               



               

 

 URINE AND  UA Mucus  Few /LPF  None Seen        



 STOOL      /LPF  /      [place]



               



               



               



               

 

 URINE AND  UA pH  5.5  5.0 - 8.0        



 STOOL              [facility]



               



               



               



               

 

 URINE AND  UA Protein  Negative  Negative        



 STOOL              [facility]



     (14 3:25 PM)          



               



               



               

 

 URINE AND  UA Turbidity  Clear  Clear        



 STOOL              [facility]



     (14 3:25 PM)          



               



               



               

 

 URINE AND  UA Spec Grav  1.025  <=1.030        



 STOOL              [place]



               



               



               



               

 

 URINE AND  UA Glucose  Negative  Negative        



 STOOL              [facility]



     (14 3:25 PM)          



               



               



               

 

 URINE AND  UA Nitrite  Negative  Negative  



 STOOL              [facility]



     (14 3:25 PM)          



               



               



               

 

 URINE AND  UA Blood  Trace  Negative        



 STOOL              [place]



     *ABN*          



               



     (14 3:25 PM)          



               

 

 URINE AND  UA Ketones  Negative  Negative        



 STOOL              [facility]



     *NA*          



               



     (14 3:25 PM)          



               

 

 URINE AND  UA Bili  Small  Negative        



 STOOL              [facility]



     *ABN*          



               



     (14 3:25 PM)          



               

 

 URINE AND  UA Color  Yellow  Yellow        



 STOOL              [facility]



     *NA*          



               



     (14 3:25 PM)          



               

 

 URINE AND  UA RBC  0-2 /HPF  0 - 2        



 STOOL              [facility]



               



               



               



               

 

 URINE AND  UA Bacteria  Few /HPF  None Seen        



 STOOL      /HPF        [facility]



               



               



               



               

 

 URINE AND  UA Sq Epi  Few /LPF  Few /LPF        



 STOOL              [place]



               



               



               



               

 

 URINE AND  UA WBC  3-5 /HPF  None Seen        



 STOOL      /HPF        [facility]



               



               



               



               

 

 URINE AND  UA  1.0 EU/dL  0.1 - 1.0        



 STOOL  Urobilinogen            [facility]



               



               



               



               

 

 URINE AND  UA Leuk Est  Small  Negative        



 STOOL              [facility]



     *ABN*          



               



     (14 3:25 PM)          



               

 

 CHEM PANEL  Lipase Lvl  54 unit/L  73 - 393        [facility]



               



               



               



               

 

 CHEM PANEL  Globulin  3.6 g/dL  2.0 - 4.0        [facility]



               



               



               



               

 

 CHEM PANEL  A/G Ratio  1.0  0.7 - 1.6        [facility]



               



               



               



               

 

 CHEM PANEL  B/C Ratio  17  6 - 25        [facility]



               



               



               



               

 

 CHEM PANEL  AGAP  10.9 meq/L  10.0 -        



       20.0  /      [facility]



               



               



               



               

 

 CHEM PANEL  AST  40 unit/L  0 - 37        [facility]



               



               



               



               

 

 CHEM PANEL  Total  7.3 g/dL  6.4 - 8.4        



   Protein      /2014      [place]



               



               



               



               

 

 CHEM PANEL  eGFR  118        1Result Comment: The eGFR is calculated 
using the CKD-EPI formula. In most young, healthy individuals the eGFR will be >
90 mL/min/1.73m2. The eGFR declines with age. An eGFR of 60-89 may be normal in
  



     mL/min/1.    some populations, particularly the elderly, for 
whom the CKD-EPI formula has not been extensively validated. Use of the eGFR is 
not recommended in the following populations:  [place]



     3m2          



             Individuals with unstable creatinine concentrations, including 
pregnant patients and those with serious co-morbid conditions.  



               



             Patients with extremes in muscle mass or diet.  



               



             The data above are obtained from the National Kidney Disease 
Education Program (NKDEP) which additionally recommends that when the eGFR is 
used in patients with extremes of body mass index for purposes  



             of drug dosing, the eGFR should be multiplied by the estimated 
BMI.  

 

 CHEM PANEL  Albumin Lvl  3.7 g/dL  3.5 - 5.0        [facility]



               



               



               



               

 

 CHEM PANEL  ALT  106 unit/L  0 - 65        [facility]



               



               



               



               

 

 CHEM PANEL  BUN  12 mg/dL  7 - 22        [facility]



               



               



               



               

 

 CHEM PANEL  Glucose Lvl  147 mg/dL  70 - 99      2Interpretive Data: 
Adult reference range values reflect the clinical guidelines      of the American Diabetes Association.  [facility]



               



               



               



               

 

 CHEM PANEL  Creatinine  0.7 mg/dL  0.5 - 1.4        [facility]



               



               



               



               

 

 CHEM PANEL  Alk Phos  80 unit/L  39 - 136        [facility]



               



               



               



               

 

 CHEM PANEL  Potassium  3.9 meq/L  3.5 - 5.1        [facility]



               



               



               



               

 

 CHEM PANEL  Sodium Lvl  140 meq/L  135 - 145        [facility]



               



               



               



               

 

 CHEM PANEL  Bili Total  1.0 mg/dL  0.2 - 1.3        [facility]



               



               



               



               

 

 CHEM PANEL  Calcium Lvl  8.8 mg/dL  8.5 - 10.5        [facility]



               



               



               



               

 

 CHEM PANEL  Chloride Lvl  109 meq/L  95 - 109        [facility]



               



               



               



               

 

 CHEM PANEL  CO2  24 meq/L  24 - 32        [facility]



               



               



               



               

 

 CHEM PANEL  Amylase Lvl  23 unit/L  25 - 115        [facility]



               



               



               



               

 

 HEMATOLOGY  Basophils #  0.1 K/CMM  0.0 - 0.2        [facility]



               



               



               



               

 

 HEMATOLOGY  Eosinophils  0.2 K/CMM  0.0 - 0.5        [facility]



               



               



               



               

 

 HEMATOLOGY  Monocytes  8.1 %  2.0 - 12.0  



         /      [facility]



               



               



               



               

 

 HEMATOLOGY  Eosinophils  2.3 %  0.0 - 4.0  



         /      [facility]



               



               



               



               

 

 HEMATOLOGY  Basophils  0.7 %  0.0 - 1.0  



         /      [facility]



               



               



               



               

 

 HEMATOLOGY  Segs  67.6 %  45.0 -        [facility]



       75.0  /      [facility]



               



               



               



               

 

 HEMATOLOGY  Lymphocytes  21.3 %  20.0 -        [facility]



       40.0  /      [facility]



               



               



               



               

 

 HEMATOLOGY  Monocytes #  0.6 K/CMM  0.0 - 0.8  /



         /      [facility]



               



               



               



               

 

 HEMATOLOGY  Segs-Bands #  5.4 K/CMM  1.5 - 8.1  



         /      [facility]



               



               



               



               

 

 HEMATOLOGY  Lymphocytes  1.7 K/CMM  1.0 - 5.5        [facility]



   #      /2014      [place]



               



               



               



               

 

 HEMATOLOGY  MPV  8.0 fL  7.4 - 10.4  



         /      [facility]  Hgb  14.8 g/dL  14.0 -        



       18.0  /      [facility]



               



               



               



               

 

 HEMATOLOGY  Hct  42.5 %  42.0 -        



       54.0  /      [facility]



               



               



               



               

 

 HEMATOLOGY  RDW  13.5 %  11.5 -        



       14.5  /      [facility]



               



               



               



               

 

 HEMATOLOGY  Platelet  244 K/CMM  133 - 450  



         /      [facility]



               



               



               



               

 

 HEMATOLOGY  WBC  8.0 K/CMM  3.7 - 10.4        [facility]



               



               



               



               

 

 HEMATOLOGY  MCV  80.5 fL  80.0 -        



       94.0  /      [facility]  MCH  28.0 pg  27.0 -        



       31.0  /      [facility]



               



               



               



               

 

 HEMATOLOGY  RBC  5.28 M/CMM  4.70 -        



       6.10  /      [facility]



               



               



               



               

 

 HEMATOLOGY  MCHC  34.8 g/dL  32.0 -        



       36.0  /      [facility]



               



               



               



               

 

 Abdomen/Pe  Abdomen/Pelv  CT scan of the abdomen and pelvis with contrast:    
    -  



 lv w IV  is w IV      /    -  [facility]



 contrast  contrast CT            



 CT              



     Exam reason:  Abdominal pain, acute  hx of diverticulitis with colectomy 
and hernia        Read by:  Arnav Valadez MD  



             Dictated Date/time:  14 16:34  



             Electronically Signed by:  Arnav Valadez MD           
   14 16:40  



             FINAL REPORT  



     Multiple computerized axial tomograms of the abdomen and pelvis were 
obtained after administration of oral and intravenous contrast.          



               



     Dependent volume loss is noted at the lung bases bilaterally. Prominent 
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 
hernia. The heart is mildly enlarged. Diffuse fatty          



      infiltration of the liver is noted. The gallbladder is distended. 
Nonspecific bilateral adrenal nodularity is noted  statistically likely 
representing adenomata. Postoperative changes are noted at the          



     ventral abdominal wall in the midline. Liver, spleen, pancreas, kidneys 
and adrenal glands have an otherwise normal  CT appearance.          



               



               



               



     Left paramedian ventral abdominal wall hernia defect measuring 4.1 cm is 
noted conveying bowel and fat into a moderate to large hernia sac containing 
fluid. Ventral abdominal wall hernia defect laterall          



     y on the left is noted measuring 5.8 cm conveying fat and bowel into a 
moderate to large hernia sac containing fluid. Skin thickening and edema are 
noted in the subcutaneous fat adjacent to these hernia          



      sacs. There is dilated fluid-filled small bowel present at the lower 
abdomen and within the hernia sacs. Decompressed distal ileal segments are 
noted.          



               



               



               



     The prostate and seminal vesicles are mildly enlarged. Postoperative 
changes are noted at the sigmoid colon. No free fluid or pneumoperitoneum is 
noted at the abdomen and pelvis.          



               



               



               



     IMPRESSION:          



               



     1. Small bowel obstruction secondary to incarcerated hernias at the left 
side of the ventral abdominal wall containing small bowel, fat and fluid within 
the hernia sacs.          



               



     2.Prominent right epicardial fat pad is noted to be distinguished from 
foramen of Morgagni hernia.          



               



     3. The heart is mildly enlarged.          



               



     4. Diffuse fatty infiltration of the liver is noted.          



               



     5. Gallbladder hydrops.          



               



               



               



               



               



     SL:12          



               



               

 

 CARDIAC  CK MB Index  0.8  0.0 - 2.5        



 ENZYMES              [facility]



               



               



               



               

 

 CARDIAC  Total CK  216 unit/L  12 - 191        



 ENZYMES              [facility]



               



               



               



               

 

 CARDIAC  CK MB  1.7 ng/mL  0.5 - 3.6        



 ENZYMES              [facility]



               



               



               



               

 

 CARDIAC  Troponin-I  null  0.00 -        



 ENZYMES      0.40  /2014      [facility]



               



               



               



               

 

 CHEM PANEL  eGFR  109        1Result Comment: The eGFR is calculated 
using the CKD-EPI formula. In most young, healthy individuals the eGFR will be >
90 mL/min/1.73m2. The eGFR declines with age. An eGFR of 60-89 may be normal in
  [facility]



     mL/min/1.7    /    some populations, particularly the elderly, for 
whom the CKD-EPI formula has not been extensively validated. Use of the eGFR is 
not recommended in the following populations:  [facility]



     3m2          



             Individuals with unstable creatinine concentrations, including 
pregnant patients and those with serious co-morbid conditions.  



               



             Patients with extremes in muscle mass or diet.  



               



             The data above are obtained from the National Kidney Disease 
Education Program (NKDEP) which additionally recommends that when the eGFR is 
used in patients with extremes of body mass index for purposes  



             of drug dosing, the eGFR should be multiplied by the estimated 
BMI.  

 

 CHEM PANEL  Calcium Lvl  9.5 mg/dL  8.5 - 10.5        [facility]



               



               



               



               

 

 CHEM PANEL  Total  8.2 g/dL  6.4 - 8.4  04/17      [facility]



   Protein      /2014      [facility]



               



               



               



               

 

 CHEM PANEL  Bili Total  0.5 mg/dL  0.2 - 1.3        [facility]



               



               



               



               

 

 CHEM PANEL  AGAP  11.0 meq/L  10.0 -  



       20.0        [facility]



               



               



               



               

 

 CHEM PANEL  Glucose Lvl  243 mg/dL  70 - 99      2Interpretive Data: 
Adult reference range values reflect the clinical guidelines      of the American Diabetes Association.  [facility]



               



               



               



               

 

 CHEM PANEL  Albumin Lvl  4.3 g/dL  3.5 - 5.0        [facility]



               



               



               



               

 

 CHEM PANEL  ALT  102 unit/L  0 - 65        [facility]



               



               



               



               

 

 CHEM PANEL  Alk Phos  116 unit/L  39 - 136        [facility]



               



               



               



               

 

 CHEM PANEL  Potassium  4.0 meq/L  3.5 - 5.1        [facility]



               



               



               



               

 

 CHEM PANEL  Sodium Lvl  137 meq/L  135 - 145        [facility]



               



               



               



               

 

 CHEM PANEL  BUN  13 mg/dL  7 - 22        [facility]



               



               



               



               

 

 CHEM PANEL  Creatinine  0.9 mg/dL  0.5 - 1.4        [facility]



               



               



               



               

 

 CHEM PANEL  Chloride Lvl  104 meq/L  95 - 109        [facility]



               



               



               



               

 

 CHEM PANEL  CO2  26 meq/L  24 - 32        [facility]



               



               



               



               

 

 CHEM PANEL  AST  27 unit/L  0 - 37        [facility]



               



               



               



               

 

 CHEM PANEL  Globulin  3.9 g/dL  2.0 - 4.0        [facility]



               



               



               



               

 

 CHEM PANEL  B/C Ratio  14  6 - 25        [facility]



               



               



               



               

 

 CHEM PANEL  A/G Ratio  1.1  0.7 - 1.6        [facility]



               



               



               



               

 

 HEMATOLOGY  Basophils #  0.1 K/CMM  0.0 - 0.2  



         /      [facility]



               



               



               



               

 

 HEMATOLOGY  Eosinophils  0.1 K/CMM  0.0 - 0.5        [facility]



   #      /2014      [facility]



               



               



               



               

 

 HEMATOLOGY  Monocytes #  0.5 K/CMM  0.0 - 0.8  



         /      [facility]



               



               



               



               

 

 HEMATOLOGY  Segs-Bands #  4.8 K/CMM  1.5 - 8.1  



         /      [facility]



               



               



               



               

 

 HEMATOLOGY  Eosinophils  1.8 %  0.0 - 4.0  



         /      [facility]



               



               



               



               

 

 HEMATOLOGY  Lymphocytes  2.3 K/CMM  1.0 - 5.5  



   #      /      [facility]



               



               



               



               

 

 HEMATOLOGY  Basophils  1.4 %  0.0 - 1.0  



         /      [facility]



               



               



               



               

 

 HEMATOLOGY  Monocytes  6.4 %  2.0 - 12.0        [facility]  Lymphocytes  29.6 %  20.0 -        



       40.0  /      [facility]



               



               



               



               

 

 HEMATOLOGY  Segs  60.8 %  45.0 -        



       75.0  /      [facility]  D-Dimer  0.27 ug/mL        3Interpretive Data: In DIC, 
quantitative D-Dimer is generally greater than  



     U    /    0.66 ug/mL FEU.  Values of quantitative D-Dimer less than  
[facility]



             0.40 ug/mL FEU have been reported to be associated with a low  



             probability of deep vein thrombosis/pulmonary embolism.  



             This test alone should not be used to rule out DVT/PE.  



               

 

 HEMATOLOGY  MPV  8.5 fL  7.4 - 10.4        [facility]  Hgb  15.4 g/dL  14.0 -  



       18.0        [facility]  RBC  5.51 M/CMM  4.70 -        [facility]



       6.10  /      [facility]



               



               



               



               

 

 HEMATOLOGY  WBC  7.9 K/CMM  3.7 - 10.4        [facility]



               



               



               



               

 

 HEMATOLOGY  MCV  81.3 fL  80.0 -        



       94.0  /      [facility]  MCHC  34.5 g/dL  32.0 -        [facility]



       36.0  /      [facility]  MCH  28.0 pg  27.0 -  



       31.0        [facility]  Hct  44.8 %  42.0 -  



       54.0  /      [facility]



               



               



               



               

 

 HEMATOLOGY  RDW  13.6 %  11.5 -        



       14.      [facility]  Platelet  255 K/CMM  133 - 450        [facility]



               



               



               



               

 

 Chest  Chest 1view  CHEST, ONE VIEW        -  



     -  [facility]



               



               



     HISTORY: Chest pain        Read by:  Kameron Bo Date/time:  14 02:23  



             Electronically Signed by:  Kameron Bo MD      
   14 02:24  



             FINAL REPORT  



     COMPARISON: 2014          



               



               



               



     FINDINGS:          



               



               



               



     Moderate to severe cardiomegaly again noted. Heart shape suggests dilated 
cardiomyopathy or pericardial effusion.          



               



               



               



     The lungs are clear. No significant pleural effusion. No pneumothorax.    
      



               



               



               



      Osseous structures unremarkable.          



               



               



               



               



               



               



               



               



               



     SL: 14          



               



               

 

 CARDIAC  CK MB  1.4 ng/mL  0.5 - 3.6        



 ENZYMES              [facility]



               



               



               



               

 

 CARDIAC  Total CK  201 unit/L  12 - 191        



 ENZYMES              [facility]



               



               



               



               

 

 CARDIAC  Troponin-I  null  0.00 -        



 ENZYMES      0.40  /      [facility]



               



               



               



               

 

 CARDIAC  CK MB Index  0.7  0.0 - 2.5        



 ENZYMES              [facility]



               



               



               



               

 

 CHEM PANEL  A/G Ratio  1.1  0.7 - 1.6        [facility]



               



               



               



               

 

 CHEM PANEL  Globulin  3.7 g/dL  2.0 - 4.0        [facility]



               



               



               



               

 

 CHEM PANEL  B/C Ratio  19  6 - 25        [facility]



               



               



               



               

 

 CHEM PANEL  AGAP  10.7 meq/L  10.0 -        



       20.0        [facility]



               



               



               



               

 

 CHEM PANEL  AST  34 unit/L  0 - 37        [facility]



               



               



               



               

 

 CHEM PANEL  Bili Total  0.6 mg/dL  0.2 - 1.3        [facility]



               



               



               



               

 

 CHEM PANEL  Total  7.7 g/dL  6.4 - 8.4        



   Protein            [facility]



               



               



               



               

 

 CHEM PANEL  CO2  24 meq/L  24 - 32        [facility]



               



               



               



               

 

 CHEM PANEL  Calcium Lvl  9.0 mg/dL  8.5 - 10.5        [facility]



               



               



               



               

 

 CHEM PANEL  Sodium Lvl  137 meq/L  135 - 145        [facility]



               



               



               



               

 

 CHEM PANEL  Potassium  3.7 meq/L  3.5 - 5.1        



   Lvl            [facility]



               



               



               



               

 

 CHEM PANEL  Chloride Lvl  106 meq/L  95 - 109        [facility]



               



               



               



               

 

 CHEM PANEL  BUN  15 mg/dL  7 - 22        [facility]



               



               



               



               

 

 CHEM PANEL  Creatinine  0.8 mg/dL  0.5 - 1.4        



   Lvl      /      [facility]



               



               



               



               

 

 CHEM PANEL  Alk Phos  86 unit/L  39 - 136        [facility]



               



               



               



               

 

 CHEM PANEL  Glucose Lvl  252 mg/dL  70 - 99      2Interpretive Data: 
Adult reference range values reflect the clinical guidelines      of the American Diabetes Association.  [facility]



               



               



               



               

 

 CHEM PANEL  eGFR  113        1Result Comment: The eGFR is calculated 
using the CKD-EPI formula. In most young, healthy individuals the eGFR will be >
90 mL/min/1.73m2. The eGFR declines with age. An eGFR of 60-89 may be normal in
  



     mL/min/1.7        some populations, particularly the elderly, for 
whom the CKD-EPI formula has not been extensively validated. Use of the eGFR is 
not recommended in the following populations:  [facility]



     3m2          



             Individuals with unstable creatinine concentrations, including 
pregnant patients and those with serious co-morbid conditions.  



               



             Patients with extremes in muscle mass or diet.  



               



             The data above are obtained from the National Kidney Disease 
Education Program (NKDEP) which additionally recommends that when the eGFR is 
used in patients with extremes of body mass index for purposes  



             of drug dosing, the eGFR should be multiplied by the estimated 
BMI.  

 

 CHEM PANEL  Albumin Lvl  4.0 g/dL  3.5 - 5.0        [facility]



               



               



               



               

 

 CHEM PANEL  ALT  108 unit/L  0 - 65        [facility]



               



               



               



               

 

 HEMATOLOGY  RDW  13.7 %  11.5 -        [facility]



       14.5        [facility]



               



               



               



               

 

 HEMATOLOGY  Hct  43.2 %  42.0 -        [facility]



       54.0        [facility]



               



               



               



               

 

 HEMATOLOGY  MCV  81.0 fL  80.0 -        



       94.0        [facility]



               



               



               



               

 

 HEMATOLOGY  MCHC  34.7 g/dL  32.0 -        [facility]



       36.0  /      [facility]



               



               



               



               

 

 HEMATOLOGY  MCH  28.1 pg  27.0 -        [facility]



       31.0  /      [facility]



               



               



               



               

 

 HEMATOLOGY  Platelet  269 K/CMM  133 - 450        [facility]



               



               



               



               

 

 HEMATOLOGY  MPV  8.2 fL  7.4 - 10.4        [facility]



               



               



               



               

 

 HEMATOLOGY  RBC  5.33 M/CMM  4.70 -        [facility]



       6.10        [facility]



               



               



               



               

 

 HEMATOLOGY  Hgb  15.0 g/dL  14.0 -        [facility]



       18.0        [facility]



               



               



               



               

 

 HEMATOLOGY  WBC  8.1 K/CMM  3.7 - 10.4  



         /      [facility]



               



               



               



               

 

 HEMATOLOGY  Basophils #  0.0 K/CMM  0.0 - 0.2  



         /      [facility]



               



               



               



               

 

 HEMATOLOGY  Monocytes  6.6 %  2.0 - 12.0  



         /      [facility]



               



               



               



               

 

 HEMATOLOGY  Segs  61.0 %  45.0 -        



       75.0  /      [facility]



               



               



               



               

 

 HEMATOLOGY  Lymphocytes  29.8 %  20.0 -        



       40.0  /      [facility]



               



               



               



               

 

 HEMATOLOGY  Eosinophils  2.2 %  0.0 - 4.0  



         /      [facility]



               



               



               



               

 

 HEMATOLOGY  Basophils  0.4 %  0.0 - 1.0        [facility]



               



               



               



               

 

 HEMATOLOGY  Eosinophils  0.2 K/CMM  0.0 - 0.5        



   #      /2014      [facility]



               



               



               



               

 

 HEMATOLOGY  Lymphocytes  2.4 K/CMM  1.0 - 5.5        



   #      /2014      [facility]



               



               



               



               

 

 HEMATOLOGY  Monocytes #  0.5 K/CMM  0.0 - 0.8        [facility]



               



               



               



               

 

 HEMATOLOGY  Segs-Bands #  4.9 K/CMM  1.5 - 8.1  



         /      [facility]



               



               



               



               

 

 HEMATOLOGY  INR  0.96  0.85 -      3Interpretive Data: RECOMMENDED RANGES 
FOR PROTIME INR:  



       .       2.0-3.0 for most medical and surgical thromboembolic 
states.  [facility]



                2.5-3.5 for artificial heart valves and recurrent embolism.  



               



             INR SHOULD BE USED ONLY FOR PATIENTS ON STABLE ANTICOAGULANT 
THERAPY.  



               

 

 HEMATOLOGY  PTT  31.7 s  22.9 -      4Interpretive  



       35.8  /    Data: Heparin  [facility]



             Therapeutic  



             Range:  57 -  



             92 Seconds  



               



               

 

 HEMATOLOGY  PT  12.7 s  12.0 -        



       14.7  /2014      [facility]



               



               



               



               

 

 Chest  Chest 1view  PROCEDURE:  Chest 1view        -  



 view        /    -  [facility]



     REASON FOR EXAM:  CHEST PAIN AND SOB X 4 DAYS          



               



     CLINICAL INFORMATION Chest pain        Read by:  Jr Bustamante  



             Dictated Date/time:  14 01:37  



             Electronically Signed by:  Jr Bustamante MD      
   14 01:39  



             FINAL REPORT  



     COMPARISON: None.          



               



               



               



     Enlargement of the cardiac silhouette is secondary to cardiomegaly or 
pericardial effusion much greater than expected for age. The pulmonary 
vasculature is normal. There are no effusions or pneumothorax. The osseous 
structures are grossly normal.          



               



               



               



     SL: 14          



               



               

 

 BEDSIDE  Gluc POC  302.0  65 - 110    HI  1Interpretive  [facility]



 GLUCOSE  Lifscn  mg/dL    /    Data:  [facility]



 TESTING              



             Upper  



             Reportable  



             Limit: 200  



             mg/dL.  



               



               

 

 BEDSIDE  Gluc POC  320.0  65 - 110    HI  2Interpretive  [facility]



 GLUCOSE  Lifscn  mg/dL    /    Data:  [facility]



 TESTING              



             Upper  



             Reportable  



             Limit: 200  



             mg/dL.  



               



               

 

 BEDSIDE  Gluc POC  null  65 - 110    CRIT  3Interpretive  [facility]



 GLUCOSE  Lifscn      /    Data:  [facility]



 TESTING              



             Upper  



             Reportable  



             Limit: 200  



             mg/dL.  



               



               

 

 BEDSIDE  Comment1  Notify      NA    [facility]



 GLUCOSE    RN/MD    /      [facility]



 TESTING              



               



               



               

 

 CHEMISTRY  Calcium Lvl  9.3 mg/dL  8.5 - 10.5    Normal    [facility]



         /      [facility]



               



               



               



               

 

 CHEMISTRY  AGAP  15.4 meq/L  10.0 -    Normal    



       20.0        [facility]



               



               



               



               

 

 CHEMISTRY  CO2  22.0 meq/L  24 - 32    LOW          [facility]



               



               



               



               

 

 CHEMISTRY  Chloride Lvl  90.0 meq/L  95 - 109    LOW          [facility]



               



               



               



               

 

 CHEMISTRY  Potassium  4.4 meq/L  3.5 - 5.1    Normal    



   Lvl            [facility]



               



               



               



               

 

 CHEMISTRY  Sodium Lvl  123.0  135 - 145    LOW    



     meq/L          [facility]



               



               



               



               

 

 CHEMISTRY  Creatinine  1.0 mg/dL  0.5 - 1.4    Normal    



   Lvl            [facility]



               



               



               



               

 

 CHEMISTRY  BUN  12.0 mg/dL  7 - 22    Normal    



         /      [facility]



               



               



               



               

 

 CHEMISTRY  Glucose Lvl  725.0      CRIT  5Interpretive  [facility]



     mg/dL    /    Data:  [facility]



             Reference  



             Ranges :  



             0 - 7 days  :  



             41 - 90  



             mg/dL7 days -  



             89 yrs : 70  



             - 99 mg/dL  



             (fasting),  



             based on the  



             clinical  



             recommendatio  



             ns of the  



             American  



             Diabetes  



             Association.  



               



               

 

 HEMATOLOGY  Monocytes #  0.4 K/CMM  0.0 - 0.8    Normal    [facility]



         /      [facility]



               



               



               



               

 

 HEMATOLOGY  Basophils #  0.0 K/CMM  0.0 - 0.2    Normal    [facility]



         /      [facility]



               



               



               



               

 

 HEMATOLOGY  Eosinophils  0.1 K/CMM  0.0 - 0.5    Normal    [facility]



   #      /      [facility]



               



               



               



               

 

 HEMATOLOGY  Segs  70.0 %  45.0 -    Normal    [facility]



       75.0        [facility]



               



               



               



               

 

 HEMATOLOGY  Monocytes  5.3 %  2.0 - 12.0    Normal    



         /      [facility]



               



               



               



               

 

 HEMATOLOGY  Lymphocytes  23.4 %  20.0 -    Normal    



       40.0  /      [facility]



               



               



               



               

 

 HEMATOLOGY  Basophils  0.3 %  0.0 - 1.0    Normal    



         /      [facility]



               



               



               



               

 

 HEMATOLOGY  Segs-Bands #  5.6 K/CMM  1.5 - 8.1    Normal    



         /      [facility]



               



               



               



               

 

 HEMATOLOGY  Eosinophils  1.0 %  0.0 - 4.0    Normal    



         /      [facility]



               



               



               



               

 

 HEMATOLOGY  Lymphocytes  1.9 K/CMM  1.0 - 5.5    Normal    [facility]



   #      /      [facility]



               



               



               



               

 

 HEMATOLOGY  RBC  5.64 M/CMM  4.70 -    Normal    [facility]



       6.10  /      [facility]



               



               



               



               

 

 HEMATOLOGY  WBC  8.1 K/CMM  3.7 - 10.4    Normal    



         /      [facility]



               



               



               



               

 

 HEMATOLOGY  MCH  29.3 pg  27.0 -    Normal    



       31.0  /      [facility]



               



               



               



               

 

 HEMATOLOGY  MCV  81.3 fL  80.0 -    Normal    



       94.0  /      [facility]



               



               



               



               

 

 HEMATOLOGY  Hct  45.9 %  42.0 -    Normal    



       54.0  /      [facility]



               



               



               



               

 

 HEMATOLOGY  Hgb  16.5 g/dL  14.0 -    Normal    



       18.0  /      [facility]



               



               



               



               

 

 HEMATOLOGY  Platelet  230.0  133 - 450    Normal    



     K/CMM    /      [facility]



               



               



               



               

 

 HEMATOLOGY  RDW  13.3 %  11.5 -    Normal    



       14.5  /      [facility]



               



               



               



               

 

 HEMATOLOGY  MCHC  36.1 g/dL  32.0 -    HI    



       36.0  /      [facility]



               



               



               



               

 

 HEMATOLOGY  MPV  9.3 fL  7.4 - 10.4    Normal          [facility]



               



               



               



               

 

 BEDSIDE  Comment1  Notify      NA    



 GLUCOSE    RN/MD    /      [facility]



 TESTING              



               



               



               







Vital Signs







 Vital Sign  Value  Date  Comments  Source



         

 

 Temperature Oral (F)  98.1 F  2018    [facility]



         



         

 

 Heart Rate  75  2018    [facility]



         



         

 

 Systolic (mm Hg)  107  2018    [facility]



         



         

 

 Diastolic (mm Hg)  69  2018    [facility]



         



         

 

 Respitory Rate  16  2018    [facility]



         



         

 

 Systolic (mm Hg)  116  2018    [facility]



         



         

 

 Diastolic (mm Hg)  74  2018    [facility]



         



         

 

 Respitory Rate  16  2018    [facility]



         



         

 

 Heart Rate  78  2018    [facility]



         



         

 

 Temperature Oral (F)  97.7 F  2018    [facility]



         



         

 

 Heart Rate  72  2018    [facility]



         



         

 

 Systolic (mm Hg)  117  2018    [facility]



         



         

 

 Diastolic (mm Hg)  73  2018    [facility]



         



         

 

 Temperature Oral (F)  98.6 F  2018    [facility]



         



         

 

 Respitory Rate  16  2018    [facility]



         



         

 

 Height  167.64 cm  2018    [facility]



         



         

 

 Weight  95.909  2018    [facility]



         



         

 

 BMI Calculated  34.13  2018    [facility]



         



         

 

 Respitory Rate  20  2018    [facility]



         



         

 

 Heart Rate  80  2018    [facility]



         



         

 

 Temperature Oral (F)  98.1 F  2018    [facility]



         



         

 

 Systolic (mm Hg)  157  2018    [facility]



         



         

 

 Diastolic (mm Hg)  107  2018    [facility]



         



         

 

 Heart Rate  98  2018    [facility]



         



         

 

 Systolic (mm Hg)  151  2018    [facility]



         



         

 

 Diastolic (mm Hg)  119  2018    [facility]



         



         

 

 Respitory Rate  18  2018    [facility]



         



         

 

 Temperature Oral (F)  98.1 F  2018    [facility]



         



         

 

 Weight  96.004  2018    [facility]



         



         

 

 Systolic (mm Hg)  124  2017    [facility]



         



         

 

 Diastolic (mm Hg)  68  2017    [facility]



         



         

 

 Temperature Oral (F)  98.2 F  2017    [facility]



         



         

 

 Heart Rate  65  2017    [facility]



         



         

 

 Respitory Rate  16  2017    [facility]



         



         

 

 Systolic (mm Hg)  138  2017    [facility]



         



         

 

 Diastolic (mm Hg)  90  2017    [facility]



         



         

 

 Weight  109.091  2017    [facility]



         



         

 

 BMI Calculated  38.82  2017    [facility]



         



         

 

 Temperature Oral (F)  98.3 F  2017    [facility]



         



         

 

 Height  167.64 cm  2017    [facility]



         



         

 

 Respitory Rate  18  2017    [facility]



         



         

 

 Heart Rate  78  2017    [facility]



         



         

 

 Systolic (mm Hg)  136  2017    [facility]



         



         

 

 Diastolic (mm Hg)  92  2017    [facility]



         



         

 

 Temperature Oral (F)  97.8 F  2014    [facility]



         



         

 

 Diastolic (mm Hg)  78  2014    [facility]



         



         

 

 Heart Rate  70  2014    [facility]



         



         

 

 Systolic (mm Hg)  126  2014    [facility]



         



         

 

 Respitory Rate  16  2014    [facility]



         



         

 

 Heart Rate  67  2014     [facility]



         



         

 

 Diastolic (mm Hg)  80  2014     [facility]



         



         

 

 Systolic (mm Hg)  123  2014    [facility]



         



         

 

 Respitory Rate  16  2014     [facility]



         



         

 

 Systolic (mm Hg)  140  2014    [facility]



         



         

 

 Diastolic (mm Hg)  90  2014    [facility]



         



         

 

 Heart Rate  77  2014    [facility]



         



         

 

 Respitory Rate  16  2014     [facility]



         



         

 

 Weight  109.091  2014    [facility]



         



         

 

 BMI Calculated  38.82  2014    [facility]



         



         

 

 Height  167.64 cm  2014    [facility]



         



         

 

 Temperature Oral (F)  97.6 F  2014    [facility]



         



         

 

 Diastolic (mm Hg)  84  2014    [facility]



         



         

 

 Respitory Rate  18  2014    [facility]



         



         

 

 Systolic (mm Hg)  145  2014    [facility]



         



         

 

 Heart Rate  75  2014    [facility]



         



         

 

 Weight  109.091  2014    [facility]



         



         

 

 Height  167.64 cm  2014    [facility]



         



         

 

 BMI Calculated  38.82  2014    [facility]



         



         

 

 Temperature Oral (F)  97.9 F  2014    [facility]



         



         

 

 Respitory Rate  18  2014    [facility]



         



         

 

 Heart Rate  72  2014     [facility]



         



         

 

 Systolic (mm Hg)  153  2014     [facility]



         



         

 

 Diastolic (mm Hg)  97  2014    [facility]



         



         

 

 Heart Rate  68  2014    [facility]



         



         

 

 Temperature Oral (F)  98.2 F  2014    [facility]



         



         

 

 Respitory Rate  19  2014     [facility]



         



         

 

 Diastolic (mm Hg)  80  2014     [facility]



         



         

 

 Systolic (mm Hg)  127  2014     [facility]



         



         

 

 Temperature Oral (F)  98.4 F  2014     [facility]



         



         

 

 Diastolic (mm Hg)  86  2014     [facility]



         



         

 

 Systolic (mm Hg)  131  2014    [facility]



         



         

 

 Heart Rate  64  2014    [facility]



         



         

 

 Respitory Rate  18  2014    [facility]



         



         

 

 Weight  109.091  2014    [facility]



         



         

 

 Respitory Rate  18  2014    [facility]



         



         

 

 Heart Rate  71  2014     [facility]



         



         

 

 Diastolic (mm Hg)  96  2014     [facility]



         



         

 

 Systolic (mm Hg)  151  2014    [facility]



         



         

 

 Temperature Oral (F)  98.4 F  2014     [facility]



         



         

 

 Temperature Oral (F)  98.5 F  2011     [facility]



         



         

 

 Systolic (mm Hg)  156.0  2011     [facility]



         



         

 

 Diastolic (mm Hg)  84.0  2011     [facility]



         



         

 

 Heart Rate  80.0  2011     [facility]



         



         

 

 Respitory Rate  18.0  2011     [facility]



         



         

 

 Respitory Rate  18.0  2011     [facility]



         



         

 

 Heart Rate  78.0  2011     [facility]



         



         

 

 Systolic (mm Hg)  168.0  2011     [facility]



         



         

 

 Diastolic (mm Hg)  93.0  2011     [facility]



         



         

 

 Diastolic (mm Hg)  81.0  2011     [facility]



         



         

 

 Heart Rate  89.0  2011     [facility]



         



         

 

 Respitory Rate  18.0  2011     [facility]



         



         

 

 Systolic (mm Hg)  178.0  2011    [facility]



         



         

 

 Temperature Oral (F)  98.3 F  2011     [facility]



         



         

 

 Height  165.1 cm  2011     [facility]



         



         

 

 Weight  113.636  2011    [facility]



         



         







Encounters







 Location  Location  Encounter  Encounter  Reason  Attending  ADM  DC  Status  
Source



   Details  Type  Number  For  Provider  Date  Date    



         Visit          



                   



                   



                   

 

     Emergency  [id number]    NICOLÁS  11/15  11/16  Active  



 [facility]      0    NRIAGU  /2011    [facility]    OBS  [id number]    Periyanan      



 Brian    [facility]  [facility]  /[facility]    [facility]    Patient      [facility]    EC Emergency  [id number]    Rossi      [facility]    Center  2    Yuli      [facility]    EC Emergency  [id number]    Stephen      [facility]    Center  3    Vick  /2014    [facility]    Emergency  [id number]    Chidi      



 Brian      4    Colleen Jr  /2017    [facility]    Emergency  [id number]    Melo      



 Brian      5    Deven  /2018    [facility]    Inpatient  [id number]    Palur      



 Brian      2    Shabbir  /2018    [facility]                  



                   



                   







Procedures







 Procedure  Code  Date  Perfomer  Comments  Source



           



           

 

 Closure of  [id number]        [facility]



 colostomy          



           

 

 Hernia repair  [id number]        [facility]



           



           

 

 Closure of  [id number]         [facility]



 colostomy          



           

 

 Hernia repair  [id number]        [facility]

## 2018-11-14 NOTE — XMS REPORT
Summary of Care: 14 - 14

 Created on:2028



Patient:DASHA HINOJOSA

Sex:Male

:1975

External Reference #:[id number]





Demographics







 Address  [address]



   [address] [place] [address]-

 

 Home Phone  [phone]

 

 Preferred Language  English

 

 Marital Status  Single

 

 Sabianism Affiliation  Amish

 

 Race  Other

 

 Ethnic Group  









Author







Care Team Providers







 Name  Role  Phone

 

 Unavailable  Primary Care Physician  Unavailable









Encounter

HQ Axel(SARAH) [id number] Date(s): 14 - 14

[facility] [address] [address]

Discharge Disposition: DC[facility] to [facility]

Physician Attending: Stephen Hickman MD





Reason for Visit

ABDOMINAL PAIN



Vital Signs







 Most recent to oldest  1  2  3



 [Reference Range]:      

 

 Height  167.64 cm    



   (14 2:29 PM)    

 

 Temperature Oral [96.4-99.1  97.8 DegF  97.6 DegF  



 DegF]  (14 8:06 PM)  (14 2:29 PM)  

 

 Systolic Blood Pressure [  126 mmHg  123 mmHg  140 mmHg



 mmHg]  (14 8:06 PM)  (14 6:30 PM)  (14 6:27 PM)

 

 Diastolic Blood Pressure [60-90  78 mmHg  80 mmHg  90 mmHg



 mmHg]  (14 8:06 PM)  (14 6:30 PM)  (14 6:27 PM)

 

 Respiratory Rate [14-20 BRMIN]  16 BRMIN  16 BRMIN  16 BRMIN



   (14 8:06 PM)  (14 6:30 PM)  (14 6:27 PM)

 

 Peripheral Pulse Rate [  70 bpm  67 bpm  77 bpm



 bpm]  (14 8:06 PM)  (14 6:30 PM)  (14 6:27 PM)

 

 Weight  109.091 kg    



   (14 2:29 PM)    

 

 Body Mass Index  38.82 m2    



   (14 2:29 PM)    







Problem List







 Condition  Effective Dates  Status  Health Status  Informant

 

 Diverticulitis(Confirmed)    Resolved    

 

 DM (diabetes mellitus)(Confirmed)    Resolved    

 

 Obesity(Confirmed)    Resolved    







Allergies, Adverse Reactions, Alerts







 Substance  Reaction  Severity  Status

 

 morphine      Active







Medications

Dilaudid

0.5 mg, Route: IV, ONCE, Dosing Weight 109.091, kg, Start date: 14 15:52:
00, Stop date: 14 15:52:00

Start Date: 14

Stop Date: 14

Status: CompletedDilaudid

1 mg, Route: IV, ONCE, Dosing Weight 109.091, kg, Start date: 14 14:42:00
, Stop date: 1414:42:00

Start Date: 14

Stop Date: 14

Status: CompletedDilaudid

0.5 mg, Route: IV, ONCE, Dosing Weight 109.091, kg, Start date: 14 18:17:
00, Stop date: 14 18:17:00

Start Date: 14

Stop Date: 14

Status: CompletedDilaudid

1 mg, Route: IV, ONCE, Dosing Weight 109.091, kg, Start date: 14 19:20:00
, Stop date: 1419:20:00

Start Date: 14

Stop Date: 14

Status: Completednormal saline 0.9% IV 1000 mL

1,000 mL, Rate: 1,000 ml/hr, Infuse over: 1 hr, Route: IV, Dosing Weight 
109.091 kg, Total Volume: 1,000, Priority: STAT, Start date: 14 14:41:00, 
Duration: 1 doses or times, Stop date: 14 15:40:00

Start Date: 14

Stop Date: 14

Status: CompletedZofran

4 mg, Route: IVP, Drug form: INJ, ONCE, Dosing Weight 109.091, kg, Priority: 
STAT, Start date: 14 14:40:00, Stop date: 14 14:40:00

Start Date: 14

Stop Date: 14

Status: Completed



Results

ELECTROLYTES





 Most recent to oldest [Reference Range]:  1

 

 Sodium Lvl [135-145 mEq/L]  140 mEq/L



   (14 2:59 PM)

 

 Potassium Lvl [3.5-5.1 mEq/L]  3.9 mEq/L



   (14 2:59 PM)

 

 Chloride Lvl [ mEq/L]  109 mEq/L



   (14 2:59 PM)

 

 CO2 [24-32 mEq/L]  24 mEq/L



   (14 2:59 PM)

 

 AGAP [10.0-20.0 mEq/L]  10.9 mEq/L



   (14 2:59 PM)



CHEM PANEL





 Most recent to oldest [Reference Range]:  1

 

 Creatinine Lvl [0.5-1.4 mg/dL]  0.7 mg/dL



   (14 2:59 PM)

 

 eGFR  118 mL/min/1.73m2 1



   *NA*



   (14 2:59 PM)

 

 BUN [7-22 mg/dL]  12 mg/dL



   (14 2:59 PM)

 

 B/C Ratio [6-25]  17



   (14 2:59 PM)

 

 Glucose Lvl [70-99 mg/dL]  147 mg/dL 2



   *HI*



   (14 2:59 PM)

 

 Total Protein [6.4-8.4 g/dL]  7.3 g/dL



   (14 2:59 PM)

 

 Albumin Lvl [3.5-5.0 g/dL]  3.7 g/dL



   (14 2:59 PM)

 

 Globulin [2.0-4.0 g/dL]  3.6 g/dL



   (14 2:59 PM)

 

 A/G Ratio [0.7-1.6]  1.0



   (14 2:59 PM)

 

 Calcium Lvl [8.5-10.5 mg/dL]  8.8 mg/dL



   (14 2:59 PM)

 

 ALT [0-65 unit/L]  106 unit/L



   *HI*



   (14 2:59 PM)

 

 AST [0-37 unit/L]  40 unit/L



   *HI*



   (14 2:59 PM)

 

 Alk Phos [ unit/L]  80 unit/L



   (7/12/14 2:59 PM)

 

 Bili Total [0.2-1.3 mg/dL]  1.0 mg/dL



   (14 2:59 PM)

 

 Amylase Lvl [ unit/L]  23 unit/L



   *LOW*



   (14 2:59 PM)

 

 Lipase Lvl [ unit/L]  54 unit/L



   *LOW*



   (14 2:59 PM)



1Result Comment: The eGFR is calculated using the CKD-EPI formula. In most young
, healthy individualsthe eGFR will be >90 mL/min/1.73m2. The eGFR declines with 
age. An eGFR of 60-89 may be normal in some populations, particularly the 
elderly, for whom the CKD-EPI formula has not been extensively validated. Use 
of the eGFR is not recommended in the following populations:



Individuals with unstable creatinine concentrations, including pregnant 
patients and those with serious co-morbid conditions.



Patients with extremes in muscle mass or diet.



The data above are obtained from the National Kidney Disease Education Program (
NKDEP) which additionally recommends that when the eGFR is used in patients 
with extremes of body mass index for purposesof drug dosing, the eGFR should be 
multiplied by the estimated BMI.2Interpretive Data: Adult reference range 
values reflect the clinical guidelines

of the American Diabetes Association.URINE AND STOOL





 Most recent to oldest [Reference Range]:  1

 

 UA Turbidity [Clear]  Clear



   (14 3:25 PM)

 

 UA Color [Yellow]  Yellow



   *NA*



   (14 3:25 PM)

 

 UA pH [5.0-8.0]  5.5



   (14 3:25 PM)

 

 UA Spec Grav [<=1.030]  1.025



   (14 3:25 PM)

 

 UA Glucose [Negative]  Negative



   (14 3:25 PM)

 

 UA Blood [Negative]  Trace



   *ABN*



   (14 3:25 PM)

 

 UA Ketones [Negative]  Negative



   *NA*



   (14 3:25 PM)

 

 UA Protein [Negative]  Negative



   (14 3:25 PM)

 

 UA Urobilinogen [0.1-1.0 EU/dL]  1.0 EU/dL



   (14 3:25 PM)

 

 UA Bili [Negative]  Small



   *ABN*



   (14 3:25 PM)

 

 UA Leuk Est [Negative]  Small



   *ABN*



   (14 3:25 PM)

 

 UA Nitrite [Negative]  Negative



   (14 3:25 PM)

 

 UA WBC [None Seen /HPF]  3-5 /HPF



   (14 3:25 PM)

 

 UA RBC [0-2 /HPF]  0-2 /HPF



   (14 3:25 PM)

 

 UA Bacteria [None Seen /HPF]  Few /HPF



   (14 3:25 PM)

 

 UA Sq Epi [Few /LPF]  Few /LPF



   (14 3:25 PM)

 

 UA Mucus [None Seen /LPF]  Few /LPF



   (14 3:25 PM)



HEMATOLOGY





 Most recent to oldest [Reference Range]:  1

 

 WBC [3.7-10.4 K/CMM]  8.0 K/CMM



   (14 2:59 PM)

 

 RBC [4.70-6.10 M/CMM]  5.28 M/CMM



   (14 2:59 PM)

 

 Hgb [14.0-18.0 g/dL]  14.8 g/dL



   (14 2:59 PM)

 

 Hct [42.0-54.0 %]  42.5 %



   (14 2:59 PM)

 

 MCV [80.0-94.0 fL]  80.5 fL



   (14 2:59 PM)

 

 MCH [27.0-31.0 pg]  28.0 pg



   (14 2:59 PM)

 

 MCHC [32.0-36.0 g/dL]  34.8 g/dL



   (14 2:59 PM)

 

 RDW [11.5-14.5 %]  13.5 %



   (14 2:59 PM)

 

 Platelet [133-450 K/CMM]  244 K/CMM



   (14 2:59 PM)

 

 MPV [7.4-10.4 fL]  8.0 fL



   (14 2:59 PM)

 

 Segs [45.0-75.0 %]  67.6 %



   (14 2:59 PM)

 

 Lymphocytes [20.0-40.0 %]  21.3 %



   (14 2:59 PM)

 

 Monocytes [2.0-12.0 %]  8.1 %



   (14 2:59 PM)

 

 Eosinophils [0.0-4.0 %]  2.3 %



   (14 2:59 PM)

 

 Basophils [0.0-1.0 %]  0.7 %



   (14 2:59 PM)

 

 Segs-Bands # [1.5-8.1 K/CMM]  5.4 K/CMM



   (14 2:59 PM)

 

 Lymphocytes # [1.0-5.5 K/CMM]  1.7 K/CMM



   (14 2:59 PM)

 

 Monocytes # [0.0-0.8 K/CMM]  0.6 K/CMM



   (14 2:59 PM)

 

 Eosinophils # [0.0-0.5 K/CMM]  0.2 K/CMM



   (14 2:59 PM)

 

 Basophils # [0.0-0.2 K/CMM]  0.1 K/CMM



   (14 2:59 PM)







Medications Administered During Your Visit

No data available for this section



Immunizations

No data available for this section



Social History







 Social History Type  Response

 

 Smoking Status  Never smoker, Exposure to Tobacco Smoke None, Cigarette Smoking



   Last 365 Days No, Reg Smoking Cessation Counseling No

## 2018-11-14 NOTE — RAD REPORT
EXAM DESCRIPTION:  Chery Single View11/14/2018 2:13 pm

 

CLINICAL HISTORY:  Chest pain

 

COMPARISON:  2011

 

FINDINGS:   A prominent right pericardiac opacity represents epicardial fat.

 

The lungs appear clear of acute infiltrate. The heart is normal size

 

IMPRESSION:   No acute abnormalities displayed

## 2018-11-14 NOTE — ER
Nurse's Notes                                                                                     

 [facility]                                                                

Name: Beto Perez                                                                               

Age: 42 yrs                                                                                       

Sex: Male                                                                                         

: 1975                                                                                   

MRN: [id number]                                                                                   

Arrival Date: 2018                                                                          

Time: 11:13                                                                                       

Account#: [id number]                                                                            

Bed 24                                                                                            

Private MD: None, None                                                                            

Diagnosis: Hyperglycemia, unspecified;Chest pain, unspecified                                     

                                                                                                  

Presentation:                                                                                     

                                                                                             

11:26 Presenting complaint: Patient states: Elevated blood sugar readings for 1 week. Chest   aj  

      pain for 4 days. Denies SOB, N/V. Transition of care: patient was not received from         

      another setting of care. Onset of symptoms was 2018. Risk Assessment: Do       

      you want to hurt yourself or someone else? Patient reports no desire to harm self or        

      others. Initial Sepsis Screen: Does the patient meet any 2 criteria? No. Patient's          

      initial sepsis screen is negative. Does the patient have a suspected source of              

      infection? No. Patient's initial sepsis screen is negative. Care prior to arrival: None.    

11:26 Method Of Arrival: Ambulatory                                                             

11:26 Acuity: DULCE 3                                                                           aj  

                                                                                                  

Triage Assessment:                                                                                

11:28 General: Appears in no apparent distress. comfortable, Behavior is calm, cooperative,   aj  

      appropriate for age. Pain: Complains of pain in chest. Neuro: Level of Consciousness is     

      awake, alert, obeys commands, Oriented to person, place, time, situation, Appropriate       

      for age. Cardiovascular: Reports chest pain, Capillary refill < 3 seconds in bilateral      

      fingers Patient's skin is warm and dry. Respiratory: Airway is patent Respiratory           

      effort is even, unlabored, Respiratory pattern is regular, symmetrical. Derm: Skin is       

      intact, is healthy with good turgor, Skin is pink, warm \T\ dry. normal.                    

                                                                                                  

Historical:                                                                                       

- Allergies:                                                                                      

: No Known Allergies;                                                                     aj  

- Home Meds:                                                                                      

11: Glipizide Oral [Active]; Metformin Oral [Active];                                       aj  

- PMHx:                                                                                           

11:28 Diabetes - NIDDM;                                                                       aj  

- PSHx:                                                                                           

11:28 colostomy with reversal; Hernia repair;                                                 aj  

                                                                                                  

- Immunization history:: Adult Immunizations up to date.                                          

- Social history:: Smoking status: Patient/guardian denies using tobacco.                         

- Ebola Screening: : Patient negative for fever greater than or equal to 101.5 degrees            

  Fahrenheit, and additional compatible Ebola Virus Disease symptoms Patient denies               

  exposure to infectious person Patient denies travel to an Ebola-affected area in the            

  21 days before illness onset No symptoms or risks identified at this time.                      

                                                                                                  

                                                                                                  

Screenin:10 Abuse screen: Denies threats or abuse. Nutritional screening: No deficits noted.        tl3 

      Tuberculosis screening: No symptoms or risk factors identified. Fall Risk None              

      identified.                                                                                 

                                                                                                  

Assessment:                                                                                       

13:10 Reassessment: pt came in because his BGL has been over 300 for the last several days,   tl3 

      no vomiting, no diarrhea. BGL in triage was 314, ate potatoes and biscuits for              

      breakfast before coming in. General: Appears in no apparent distress. comfortable,          

      obese, well groomed, well developed, well nourished, Behavior is calm, cooperative,         

      appropriate for age. Pain: Denies pain. Neuro: Level of Consciousness is awake, alert,      

      obeys commands, Oriented to person, place, time, situation, Appropriate for age.            

      Cardiovascular: Patient's skin is warm and dry. Respiratory: Airway is patent. GI: No       

      signs and/or symptoms were reported involving the gastrointestinal system. : No signs     

      and/or symptoms were reported regarding the genitourinary system. EENT: No signs and/or     

      symptoms were reported regarding the EENT system. Derm: No signs and/or symptoms            

      reported regarding the dermatologic system. Musculoskeletal: No signs and/or symptoms       

      reported regarding the musculoskeletal system.                                              

14:51 Reassessment: Patient appears in no apparent distress at this time. No changes from     tl3 

      previously documented assessment. Patient and/or family updated on plan of care and         

      expected duration. Pain level reassessed. Patient is alert, oriented x 3, equal             

      unlabored respirations, skin warm/dry/pink. pt sleeping, no needs at this time.             

                                                                                                  

Vital Signs:                                                                                      

11:28  / 89; Pulse 71; Resp 16; Temp 98.9; Pulse Ox 98% on R/A; Weight 99.34 kg; Height aj  

      5 ft. 6 in. (167.64 cm);                                                                    

13:19  / 91; Pulse 81; Resp 18; Pulse Ox 97% ;                                          tl3 

14:51  / 80; Pulse 64; Resp 18; Pulse Ox 99% on R/A;                                    tl3 

11:28 Body Mass Index 35.35 (99.34 kg, 167.64 cm)                                               

                                                                                                  

ED Course:                                                                                        

11:13 Patient arrived in ED.                                                                  mr  

11:13 None, None is Private Physician.                                                        mr  

11:28 Triage completed.                                                                       aj  

11:28 Arm band placed on left wrist. Patient placed in waiting room. Patient EKG paged.       aj  

12:26 EKG done, by EKG tech. reviewed by Blayne Gallegos MD.                                    at1 

13:10 Patient has correct armband on for positive identification. Bed in low position. Call   tl3 

      light in reach. Side rails up X 1. Cardiac monitor on. Pulse ox on. NIBP on.                

13:10 No provider procedures requiring assistance completed.                                  tl3 

13:18 Coby Guerra, RN is Primary Nurse.                                                     tl3 

13:19 Lorie Rees FNP-C is PHCP.                                                        kb  

13:19 Blayne Gallegos MD is Attending Physician.                                              kb  

13:40 Initial lab(s) drawn, by me, sent to lab. Urine collected: clean catch specimen, clear, jp3 

      chidi colored.                                                                              

13:55 Inserted saline lock: 20 gauge in right antecubital area, using aseptic technique.      jp3 

      Blood collected.                                                                            

13:56 Basic Metabolic Panel Sent.                                                             jp3 

13:56 CBC with Diff Sent.                                                                     jp3 

13:56 LFT's Sent.                                                                             jp3 

13:56 Magnesium Sent.                                                                         jp3 

13:56 PT-INR Sent.                                                                            jp3 

13:56 NT PRO-BNP Sent.                                                                        jp3 

13:56 Troponin (emerg Dept Use Only) Sent.                                                    jp3 

14:11 X-ray completed. Portable x-ray completed in exam room. Patient tolerated procedure     ag1 

      well.                                                                                       

14:12 XRAY Chest (1 view) In Process Unspecified.                                             EDMS

15:54 REPEAT EKG WAS DONE.                                                                    sm3 

17:15 IV discontinued, intact, bleeding controlled, No redness/swelling at site. Pressure     tl3 

      dressing applied.                                                                           

                                                                                                  

Administered Medications:                                                                         

15:23 Drug: NS 0.9% 1000 ml Route: IV; Rate: 1000 ml; Site: right antecubital; Delivery:      tl3 

      Primary tubing;                                                                             

16:30 Follow up: IV Status: Completed infusion; IV Intake: 1000ml                             tl3 

                                                                                                  

                                                                                                  

Point of Care Testing:                                                                            

      Blood Glucose:                                                                              

11:28 Blood Glucose: 314 mg/dL;                                                               aj  

      Ranges:                                                                                     

                                                                                                  

Intake:                                                                                           

16:30 IV: 1000ml; Total: 1000ml.                                                              tl3 

                                                                                                  

Outcome:                                                                                          

16:48 Discharge ordered by MD.                                                                kb  

17:31 Patient left the ED.                                                                    tl3 

17:35 Discharge instructions given to patient, Instructed on discharge instructions, follow   tl3 

      up and referral plans. Demonstrated understanding of instructions, follow-up care.          

17:49 Discharged to home ambulatory.                                                          tl3 

17:49 Condition: stable                                                                           

                                                                                                  

Signatures:                                                                                       

Dispatcher [employer]                           Lorie Rodriguez, ESTRELLA SKYP-Selene Biggs, RN                       Vidya Allen                                 mr                                                   

ShinSelene, EKG Tech              EKG Tat1                                                  

Maria Elena Chavez1                                                  

Coby Guerra RN RN   tl3                                                  

Milka Dumont                              3                                                  

Jann Nelson                              3                                                  

                                                                                                  

**************************************************************************************************

## 2018-11-14 NOTE — XMS REPORT
Summary of Care: 18 - 18

 Created on:2022



Patient:DASHA HINOJOSA

Sex:Male

:1975

External Reference #:[id number]





Demographics







 Address  [address] [place] [address]-

 

 Home Phone  [phone]

 

 Email Address  NONE

 

 Preferred Language  English

 

 Marital Status  Single

 

 Adventist Affiliation  Restoration

 

 Race  Other

 

 Additional Race(s)  Unavailable

 

 Ethnic Group   or 









Author







 Organization  [facility]

 

 Address  [id number] [place] [address]-

 

 Phone  [phone]









Encounter

HQ Axel(FIN) [id number] Date(s): 18 - 18

[address] [address]-     
 

Encounter Diagnosis

ST elevation (STEMI) myocardial infarction of unspecified site (Final) - 18

Essential (primary) hypertension (Final) -

Type 2 diabetes mellitus without complications (Final) -

Long term (current) use of oral hypoglycemic drugs (Final) -

Discharge Disposition: Acute Care

Attending Physician: Melo Carvalho MD





Vital Signs







 Most recent to oldest [Reference Range]:  1  2

 

 Temperature Oral [96.4-99.1 DegF]  98.1 DegF  98.1 DegF



   (18 6:40 AM)  (18 6:29 AM)

 

 Blood Pressure [/60-90 mmHg]  157/107 mmHg  151/119 mmHg



   *HI*  *HI*



   (18 6:40 AM)  (18 6:29 AM)

 

 Respiratory Rate [14-20 BRMIN]  20 BRMIN  18 BRMIN



   (18 6:40 AM)  (18 6:29 AM)

 

 Peripheral Pulse Rate [ bpm]  80 bpm  98 bpm



   (18 6:40 AM)  (18 6:29 AM)

 

 Weight  96.004 kg  



   (18 6:29 AM)  







Problem List







 Condition  Effective Dates  Status  Health Status  Informant

 

 Diverticulitis(Confirmed)    Resolved    

 

 DM (diabetes mellitus)(Confirmed)    Resolved    

 

 Obesity(Confirmed)    Resolved    







Allergies, Adverse Reactions, Alerts







 Substance  Reaction  Severity  Status

 

 morphine      Active

 

 Vicodin      Active

 

 Norco      Active







Medications

metoprolol 5 mg/5 ml INJ

5 mg, Route: IVP, Drug form: INJ, ONCE, Dosing Weight 96.004, kg, Priority: STAT
, Start date: 18 6:48:00 CDT, Stop date: 18 6:48:00 CDT

Start Date: 18

Stop Date: 18

Status: Completednitroglycerin 0.4 mg sublingual tablet

0.4 mg, 1 tab, Route: SL, Drug form: TAB, Q5Min, Dosing Weight 96.004, kg, PRN 
Chest Pain, Priority:STAT, Start date: 18 6:47:00 CDT, Duration: 3 doses 
or times, Stop date: Limited # of times

Notes: (Same as:Nitroquick, Nitrostat)"Do Not Crush"  Sublingual tablet

Start Date: 18

Stop Date: 18

Status: DiscontinuedSaline Flush 0.9%

10 mL, Route: IVP, Drug Form: INJ, Dosing Weight 96.004, kg, PRN, PRN Line Flush
, Start date: 18 6:33:00 CDT, Duration: 30 day, Stop date: 18 6:32:
00 CDT

Notes: (Same as: BD Posiflush)

Start Date: 18

Stop Date: 18

Status: DiscontinuedSodium Chloride 0.9% (Bolus) IV

1,000 mL, Infuse Over: 1 hr, Route: IV, ONCE, Priority: STAT, Dosing Weight 
96.004 kg, Start date: 18 6:52:00 CDT, Stop date: 18 6:52:00 CDT

Start Date: 18

Stop Date: 18

Status: Completed



Results

ELECTROLYTES





 Most recent to oldest [Reference Range]:  1

 

 Sodium Lvl [135-145 mEq/L]  132 mEq/L



   *LOW*



   (18 6:37 AM)

 

 Potassium Lvl [3.5-5.1 mEq/L]  3.8 mEq/L



   (18 6:37 AM)

 

 Chloride Lvl [ mEq/L]  99 mEq/L



   (18 6:37 AM)

 

 CO2 [24-32 mEq/L]  29 mEq/L



   (18 6:37 AM)

 

 AGAP [10.0-20.0 mEq/L]  7.8 mEq/L



   *LOW*



   (18 6:37 AM)



CHEM PANEL





 Most recent to oldest [Reference Range]:  1

 

 Creatinine Lvl [0.50-1.40 mg/dL]  0.81 mg/dL



   (18 6:37 AM)

 

 eGFR  110 mL/min/1.73m2 1



   *NA*



   (18 6:37 AM)

 

 BUN [7-22 mg/dL]  16 mg/dL



   (18 6:37 AM)

 

 B/C Ratio [6-25]  20



   (18 6:37 AM)

 

 Glucose Lvl [70-99 mg/dL]  268 mg/dL



   *HI*



   (18 6:37 AM)

 

 Total Protein [6.4-8.4 g/dL]  8.2 g/dL



   (18 6:37 AM)

 

 Albumin Lvl [3.5-5.0 g/dL]  4.1 g/dL



   (18 6:37 AM)

 

 Globulin [2.7-4.2 g/dL]  4.1 g/dL



   (18 6:37 AM)

 

 A/G Ratio [0.7-1.6]  1.0



   (18 6:37 AM)

 

 Calcium Lvl [8.5-10.5 mg/dL]  8.5 mg/dL



   (18 6:37 AM)

 

 ALT [0-65 unit/L]  65 unit/L



   (18 6:37 AM)

 

 AST [0-37 unit/L]  37 unit/L



   (18 6:37 AM)

 

 Alk Phos [ unit/L]  137 unit/L



   *HI*



   (18 6:37 AM)

 

 Bili Total [0.2-1.3 mg/dL]  1.0 mg/dL



   (18 6:37 AM)



1Result Comment: The eGFR is calculated using the CKD-EPI formula. In most young
, healthy individualsthe eGFR will be &gt;90 mL/min/1.73m2. The eGFR declines 
with age. An eGFR of 60-89 may be normal insome populations, particularly the 
elderly, for whom the CKD-EPI formula has not been extensively validated. Use 
of the eGFR is not recommended in the following populations:



Individuals with unstable creatinine concentrations, including pregnant 
patients and those with serious co-morbid conditions.



Patients with extremes in muscle mass or diet.



The data above are obtained from the National Kidney Disease Education Program (
NKDEP) which additionally recommends that when the eGFR is used in patients 
with extremes of body mass index for purposesof drug dosing, the eGFR should be 
multiplied by the estimated BMI.CARDIAC ENZYMES





 Most recent to oldest [Reference Range]:  1

 

 Total CK [ unit/L]  220 unit/L



   *HI*



   (18 6:37 AM)

 

 CK MB [0.5-3.6 ng/mL]  2.8 ng/mL



   (18 6:37 AM)

 

 CK MB Index [0.0-2.5]  1.3



   (18 6:37 AM)

 

 Troponin-I [0.00-0.40 ng/mL]  <0.02 ng/mL



   (18 6:37 AM)



HEMATOLOGY





 Most recent to oldest [Reference Range]:  1

 

 WBC [3.7-10.4 K/CMM]  7.4 K/CMM



   (18 6:37 AM)

 

 RBC [4.70-6.10 M/CMM]  5.75 M/CMM



   (18 6:37 AM)

 

 Hgb [14.0-18.0 g/dL]  17.2 g/dL



   (18 6:37 AM)

 

 Hct [42.0-54.0 %]  47.1 %



   (18 6:37 AM)

 

 MCV [80.0-94.0 fL]  81.9 fL



   (18 6:37 AM)

 

 MCH [27.0-31.0 pg]  29.9 pg



   (18 6:37 AM)

 

 MCHC [32.0-36.0 g/dL]  36.5 g/dL



   *HI*



   (18 6:37 AM)

 

 RDW [11.5-14.5 %]  13.8 %



   (18 6:37 AM)

 

 MPV [7.4-10.4 fL]  7.9 fL



   (18 6:37 AM)

 

 Platelet [133-450 K/CMM]  265 K/CMM



   (18 6:37 AM)

 

 Segs [45.0-75.0 %]  54.8 %



   (18 6:37 AM)

 

 Lymphocytes [20.0-40.0 %]  33.4 %



   (18 6:37 AM)

 

 Monocytes [2.0-12.0 %]  8.4 %



   (18 6:37 AM)

 

 Eosinophils [0.0-4.0 %]  2.4 %



   (18 6:37 AM)

 

 Basophils [0.0-1.0 %]  1.0 %



   (18 6:37 AM)

 

 Segs-Bands # [1.5-8.1 K/CMM]  4.1 K/CMM



   (18 6:37 AM)

 

 Lymphocytes # [1.0-5.5 K/CMM]  2.5 K/CMM



   (18 6:37 AM)

 

 Monocytes # [0.0-0.8 K/CMM]  0.6 K/CMM



   (18 6:37 AM)

 

 Eosinophils # [0.0-0.5 K/CMM]  0.2 K/CMM



   (18 6:37 AM)

 

 Basophils # [0.0-0.2 K/CMM]  0.1 K/CMM



   (18 6:37 AM)







Immunizations

No data available for this section



Procedures







 Procedure  Date  Related Diagnosis  Body Site  Status

 

 Closure of colostomy        Completed

 

 Hernia repair        Completed







Social History







 Social History Type  Response

 

 Alcohol  Type Liquor.  Frequency: 1-2 times per month.

 

 Smoking Status  Never smoker; Previous treatment: None; Ready to change: No; 
Concerns about tobacco use in household: No; Exposure to Tobacco Smoke None; 
Cigarette Smoking Last 365 Days No; Reg Smoking Cessation Counseling No



   entered on: 18







Assessment and Plan

No data available for this section

## 2018-11-14 NOTE — EKG
Test Date:    2018-11-14               Test Time:    12:09:14

Technician:   KOTA                                     

                                                     

MEASUREMENT RESULTS:                                       

Intervals:                                           

Rate:         75                                     

NC:           140                                    

QRSD:         88                                     

QT:           350                                    

QTc:          390                                    

Axis:                                                

P:            33                                     

NC:           140                                    

QRS:          -12                                    

T:            8                                      

                                                     

INTERPRETIVE STATEMENTS:                                       

                                                     

Normal sinus rhythm

Inferior infarct, age undetermined

Abnormal ECG

Compared to ECG 11/17/2011 11:47:08

Myocardial infarct finding now present

Left ventricular hypertrophy no longer present

Early repolarization no longer present



Electronically Signed On 11-14-18 15:30:28 CST by Raj Hughes

## 2018-11-14 NOTE — XMS REPORT
Summary of Care: 14 - 14

 Created on:2043



Patient:DASHA HINOJOSA

Sex:Male

:1975

External Reference #:[id number]





Demographics







 Address  [address]



   [address] [place] [address]-

 

 Home Phone  [phone]

 

 Preferred Language  English

 

 Marital Status  Single

 

 R[employer] Affiliation  [employer]

 

 Race  Other

 

 Ethnic Group  









Author







Care Team Providers







 Name  Role  Phone

 

 Unavailable  Primary Care Physician  Unavailable









Encounter

HQ Axel(SARAH) [id number] Date(s): 14 - 14

[facility] [address] [address]
-     [place]

Discharge Diagnosis: Costochondritis, Chest wall pain

Discharge Disposition: Home

Physician Attending: Rossi Roldan





Reason for Visit

CHEST PAIN



Vital Signs







 Most recent to oldest [Reference Range]:  1  2

 

 Height  167.64 cm  



   (14 1:19 AM)  

 

 Temperature Oral [96.4-99.1 DegF]  97.9 DegF  



   (14 1:19 AM)  

 

 Systolic Blood Pressure [ mmHg]  145 mmHg  153 mmHg



   *HI*  *HI*



   (14 3:12 AM)  (14 1:19 AM)

 

 Diastolic Blood Pressure [60-90 mmHg]  84 mmHg  97 mmHg



   (14 3:12 AM)  *HI*



     (14 1:19 AM)

 

 Respiratory Rate [14-20 BRMIN]  18 BRMIN  18 BRMIN



   (14 3:12 AM)  (14 1:19 AM)

 

 Peripheral Pulse Rate [ bpm]  75 bpm  72 bpm



   (14 3:12 AM)  (14 1:19 AM)

 

 Weight  109.091 kg  



   (14 1:19 AM)  

 

 Body Mass Index  38.82 m2  



   (14 1:19 AM)  







Problem List







 Condition  Effective Dates  Status  Health Status  Informant

 

 Diverticulitis(Confirmed)    Resolved    

 

 DM (diabetes mellitus)(Confirmed)    Resolved    

 

 Obesity(Confirmed)    Resolved    







Allergies, Adverse Reactions, Alerts







 Substance  Reaction  Severity  Status

 

 morphine      Active







Medications

aspirin 325 mg tablet

325 mg, Route: PO, Drug form: TAB, ONCE, Dosing Weight 109.091, kg, Priority: 
STAT, Start date: 14 1:27:00, Stop date: 14 1:27:00

Start Date: 14

Stop Date: 14

Status: Completedindomethacin 50 mg oral capsule

50 mg=1 cap, PO, Q8H, # 30 cap, 0 Refill(s)

Start Date: 14

Stop Date: 14

Status: Orderedketorolac

30 mg, Route: IVP, Drug form: INJ, ONCE, Dosing Weight 109.091, kg, Priority: 
STAT, Start date: 14 1:27:00, Stop date: 14 1:27:00

Start Date: 14

Stop Date: 14

Status: CompletedSaline Flush 0.9%

5 ml, Route: IVP, Drug Form: INJ, Dosing Weight 109.091, kg, PRN, PRN Line Flush
, Start date: 14 1:27:00, Duration: 30 day, Stop date: 14 1:26:00

Notes: (Same as: BD Posiflush)

Start Date: 14

Stop Date: 14

Status: Discontinued



Results

ELECTROLYTES





 Most recent to oldest [Reference Range]:  1

 

 Sodium Lvl [135-145 mEq/L]  137 mEq/L



   (14 1:40 AM)

 

 Potassium Lvl [3.5-5.1 mEq/L]  4.0 mEq/L



   (14 1:40 AM)

 

 Chloride Lvl [ mEq/L]  104 mEq/L



   (14 1:40 AM)

 

 CO2 [24-32 mEq/L]  26 mEq/L



   (14 1:40 AM)

 

 AGAP [10.0-20.0 mEq/L]  11.0 mEq/L



   (14 1:40 AM)



CHEM PANEL





 Most recent to oldest [Reference Range]:  1

 

 Creatinine Lvl [0.5-1.4 mg/dL]  0.9 mg/dL



   (14 1:40 AM)

 

 eGFR  109 mL/min/1.73m2 1



   *NA*



   (14 1:40 AM)

 

 BUN [7-22 mg/dL]  13 mg/dL



   (14 1:40 AM)

 

 B/C Ratio [6-25]  14



   (14 1:40 AM)

 

 Glucose Lvl [70-99 mg/dL]  243 mg/dL 2



   *HI*



   (14 1:40 AM)

 

 Total Protein [6.4-8.4 g/dL]  8.2 g/dL



   (14 1:40 AM)

 

 Albumin Lvl [3.5-5.0 g/dL]  4.3 g/dL



   (14 1:40 AM)

 

 Globulin [2.0-4.0 g/dL]  3.9 g/dL



   (14 1:40 AM)

 

 A/G Ratio [0.7-1.6]  1.1



   (14 1:40 AM)

 

 Calcium Lvl [8.5-10.5 mg/dL]  9.5 mg/dL



   (14 1:40 AM)

 

 ALT [0-65 unit/L]  102 unit/L



   *HI*



   (14 1:40 AM)

 

 AST [0-37 unit/L]  27 unit/L



   (14 1:40 AM)

 

 Alk Phos [ unit/L]  116 unit/L



   (14 1:40 AM)

 

 Bili Total [0.2-1.3 mg/dL]  0.5 mg/dL



   (14 1:40 AM)



1Result Comment: The eGFR is calculated using the CKD-EPI formula. In most young
, healthy individualsthe eGFR will be >90 mL/min/1.73m2. The eGFR declines with 
age. An eGFR of 60-89 may be normal in some populations, particularly the 
elderly, for whom the CKD-EPI formula has not been extensively validated. Use 
of the eGFR is not recommended in the following populations:



Individuals with unstable creatinine concentrations, including pregnant 
patients and those with serious co-morbid conditions.



Patients with extremes in muscle mass or diet.



The data above are obtained from the National Kidney Disease Education Program (
NKDEP) which additionally recommends that when the eGFR is used in patients 
with extremes of body mass index for purposesof drug dosing, the eGFR should be 
multiplied by the estimated BMI.2Interpretive Data: Adult reference range 
values reflect the clinical guidelines

of the American Diabetes Association.CARDIAC ENZYMES





 Most recent to oldest [Reference Range]:  1

 

 Total CK [ unit/L]  216 unit/L



   *HI*



   (14 1:40 AM)

 

 CK MB [0.5-3.6 ng/mL]  1.7 ng/mL



   (14 1:40 AM)

 

 CK MB Index [0.0-2.5]  0.8



   (14 1:40 AM)

 

 Troponin-I [0.00-0.40 ng/mL]  <0.02 ng/mL



   (14 1:40 AM)



HEMATOLOGY





 Most recent to oldest [Reference Range]:  1

 

 WBC [3.7-10.4 K/CMM]  7.9 K/CMM



   (14 1:40 AM)

 

 RBC [4.70-6.10 M/CMM]  5.51 M/CMM



   (14 1:40 AM)

 

 Hgb [14.0-18.0 g/dL]  15.4 g/dL



   (14 1:40 AM)

 

 Hct [42.0-54.0 %]  44.8 %



   (14 1:40 AM)

 

 MCV [80.0-94.0 fL]  81.3 fL



   (14 1:40 AM)

 

 MCH [27.0-31.0 pg]  28.0 pg



   (14 1:40 AM)

 

 MCHC [32.0-36.0 g/dL]  34.5 g/dL



   (14 1:40 AM)

 

 RDW [11.5-14.5 %]  13.6 %



   (14 1:40 AM)

 

 Platelet [133-450 K/CMM]  255 K/CMM



   (14 1:40 AM)

 

 MPV [7.4-10.4 fL]  8.5 fL



   (14 1:40 AM)

 

 Segs [45.0-75.0 %]  60.8 %



   (14 1:40 AM)

 

 Lymphocytes [20.0-40.0 %]  29.6 %



   (14 1:40 AM)

 

 Monocytes [2.0-12.0 %]  6.4 %



   (14 1:40 AM)

 

 Eosinophils [0.0-4.0 %]  1.8 %



   (14 1:40 AM)

 

 Basophils [0.0-1.0 %]  1.4 %



   *HI*



   (14 1:40 AM)

 

 Segs-Bands # [1.5-8.1 K/CMM]  4.8 K/CMM



   (14 1:40 AM)

 

 Lymphocytes # [1.0-5.5 K/CMM]  2.3 K/CMM



   (14 1:40 AM)

 

 Monocytes # [0.0-0.8 K/CMM]  0.5 K/CMM



   (14 1:40 AM)

 

 Eosinophils # [0.0-0.5 K/CMM]  0.1 K/CMM



   (14 1:40 AM)

 

 Basophils # [0.0-0.2 K/CMM]  0.1 K/CMM



   (14 1:40 AM)

 

 D-Dimer  0.27 ug/mL FEU 3



   *NA*



   (14 1:40 AM)



3Interpretive Data: In DIC, quantitative D-Dimer is generally greater than

0.66 ug/mL FEU.  Values of quantitative D-Dimer less than

0.40 ug/mL FEU have been reported to be associated with a low

probability of deep vein thrombosis/pulmonary embolism.

This test alone should not be used to rule out DVT/PE.



Medications Administered During Your Visit

No data available for this section



Immunizations

No data available for this section

## 2018-11-15 NOTE — EKG
Test Date:    2018-11-14               Test Time:    15:47:36

Technician:   KIM                                     

                                                     

MEASUREMENT RESULTS:                                       

Intervals:                                           

Rate:         69                                     

FL:           144                                    

QRSD:         96                                     

QT:           396                                    

QTc:          424                                    

Axis:                                                

P:            20                                     

FL:           144                                    

QRS:          -6                                     

T:            34                                     

                                                     

INTERPRETIVE STATEMENTS:                                       

                                                     

Normal sinus rhythm

Lateral infarct, age undetermined

Possible Inferior infarct, age undetermined

Abnormal ECG

Compared to ECG 11/14/2018 12:09:14

No significant changes



Electronically Signed On 11-15-18 12:33:07 CST by Irwin Piedra